# Patient Record
Sex: MALE | URBAN - METROPOLITAN AREA
[De-identification: names, ages, dates, MRNs, and addresses within clinical notes are randomized per-mention and may not be internally consistent; named-entity substitution may affect disease eponyms.]

---

## 2022-10-08 ENCOUNTER — INPATIENT (INPATIENT)
Facility: HOSPITAL | Age: 72
LOS: 4 days | Discharge: HOME | End: 2022-10-13
Attending: INTERNAL MEDICINE | Admitting: INTERNAL MEDICINE

## 2022-10-08 VITALS
DIASTOLIC BLOOD PRESSURE: 99 MMHG | HEIGHT: 62 IN | RESPIRATION RATE: 26 BRPM | OXYGEN SATURATION: 100 % | WEIGHT: 134.04 LBS | TEMPERATURE: 99 F | HEART RATE: 146 BPM | SYSTOLIC BLOOD PRESSURE: 162 MMHG

## 2022-10-08 DIAGNOSIS — A41.9 SEPSIS, UNSPECIFIED ORGANISM: ICD-10-CM

## 2022-10-08 DIAGNOSIS — J18.9 PNEUMONIA, UNSPECIFIED ORGANISM: ICD-10-CM

## 2022-10-08 DIAGNOSIS — J44.1 CHRONIC OBSTRUCTIVE PULMONARY DISEASE WITH (ACUTE) EXACERBATION: ICD-10-CM

## 2022-10-08 DIAGNOSIS — I10 ESSENTIAL (PRIMARY) HYPERTENSION: ICD-10-CM

## 2022-10-08 LAB
ALBUMIN SERPL ELPH-MCNC: 3.3 G/DL — SIGNIFICANT CHANGE UP (ref 3.3–5)
ALP SERPL-CCNC: 88 U/L — SIGNIFICANT CHANGE UP (ref 40–120)
ALT FLD-CCNC: 18 U/L — SIGNIFICANT CHANGE UP (ref 12–78)
ANION GAP SERPL CALC-SCNC: 7 MMOL/L — SIGNIFICANT CHANGE UP (ref 5–17)
ANISOCYTOSIS BLD QL: SLIGHT — SIGNIFICANT CHANGE UP
APTT BLD: 27.9 SEC — SIGNIFICANT CHANGE UP (ref 27.5–35.5)
AST SERPL-CCNC: 11 U/L — LOW (ref 15–37)
BASE EXCESS BLDA CALC-SCNC: 6.9 MMOL/L — HIGH (ref -2–3)
BASOPHILS # BLD AUTO: 0 K/UL — SIGNIFICANT CHANGE UP (ref 0–0.2)
BASOPHILS NFR BLD AUTO: 0 % — SIGNIFICANT CHANGE UP (ref 0–2)
BILIRUB SERPL-MCNC: 0.5 MG/DL — SIGNIFICANT CHANGE UP (ref 0.2–1.2)
BLOOD GAS COMMENTS ARTERIAL: SIGNIFICANT CHANGE UP
BUN SERPL-MCNC: 25 MG/DL — HIGH (ref 7–23)
CALCIUM SERPL-MCNC: 9.3 MG/DL — SIGNIFICANT CHANGE UP (ref 8.5–10.1)
CHLORIDE SERPL-SCNC: 98 MMOL/L — SIGNIFICANT CHANGE UP (ref 96–108)
CO2 BLDA-SCNC: 36 MMOL/L — HIGH (ref 19–24)
CO2 SERPL-SCNC: 31 MMOL/L — SIGNIFICANT CHANGE UP (ref 22–31)
CREAT SERPL-MCNC: 1.02 MG/DL — SIGNIFICANT CHANGE UP (ref 0.5–1.3)
D DIMER BLD IA.RAPID-MCNC: 261 NG/ML DDU — HIGH
EGFR: 80 ML/MIN/1.73M2 — SIGNIFICANT CHANGE UP
EOSINOPHIL # BLD AUTO: 0 K/UL — SIGNIFICANT CHANGE UP (ref 0–0.5)
EOSINOPHIL NFR BLD AUTO: 0 % — SIGNIFICANT CHANGE UP (ref 0–6)
FLUAV AG NPH QL: SIGNIFICANT CHANGE UP
FLUBV AG NPH QL: SIGNIFICANT CHANGE UP
GAS PNL BLDA: SIGNIFICANT CHANGE UP
GLUCOSE SERPL-MCNC: 185 MG/DL — HIGH (ref 70–99)
HCO3 BLDA-SCNC: 34 MMOL/L — HIGH (ref 21–28)
HCT VFR BLD CALC: 42.6 % — SIGNIFICANT CHANGE UP (ref 39–50)
HGB BLD-MCNC: 13.2 G/DL — SIGNIFICANT CHANGE UP (ref 13–17)
HOROWITZ INDEX BLDA+IHG-RTO: 50 — SIGNIFICANT CHANGE UP
INR BLD: 1.08 RATIO — SIGNIFICANT CHANGE UP (ref 0.88–1.16)
LACTATE SERPL-SCNC: 1.8 MMOL/L — SIGNIFICANT CHANGE UP (ref 0.7–2)
LG PLATELETS BLD QL AUTO: SIGNIFICANT CHANGE UP
LYMPHOCYTES # BLD AUTO: 0.43 K/UL — LOW (ref 1–3.3)
LYMPHOCYTES # BLD AUTO: 2 % — LOW (ref 13–44)
MACROCYTES BLD QL: SLIGHT — SIGNIFICANT CHANGE UP
MANUAL SMEAR VERIFICATION: SIGNIFICANT CHANGE UP
MCHC RBC-ENTMCNC: 26.2 PG — LOW (ref 27–34)
MCHC RBC-ENTMCNC: 31 G/DL — LOW (ref 32–36)
MCV RBC AUTO: 84.7 FL — SIGNIFICANT CHANGE UP (ref 80–100)
MONOCYTES # BLD AUTO: 0.65 K/UL — SIGNIFICANT CHANGE UP (ref 0–0.9)
MONOCYTES NFR BLD AUTO: 3 % — SIGNIFICANT CHANGE UP (ref 2–14)
NEUTROPHILS # BLD AUTO: 20.54 K/UL — HIGH (ref 1.8–7.4)
NEUTROPHILS NFR BLD AUTO: 92 % — HIGH (ref 43–77)
NEUTS BAND # BLD: 3 % — SIGNIFICANT CHANGE UP (ref 0–8)
NRBC # BLD: 0 /100 — SIGNIFICANT CHANGE UP (ref 0–0)
NRBC # BLD: SIGNIFICANT CHANGE UP /100 WBCS (ref 0–0)
NT-PROBNP SERPL-SCNC: 417 PG/ML — HIGH (ref 0–125)
OVALOCYTES BLD QL SMEAR: SLIGHT — SIGNIFICANT CHANGE UP
PCO2 BLDA: 59 MMHG — HIGH (ref 32–46)
PH BLDA: 7.37 — SIGNIFICANT CHANGE UP (ref 7.35–7.45)
PLAT MORPH BLD: NORMAL — SIGNIFICANT CHANGE UP
PLATELET # BLD AUTO: 501 K/UL — HIGH (ref 150–400)
PLATELET COUNT - ESTIMATE: NORMAL — SIGNIFICANT CHANGE UP
PO2 BLDA: 227 MMHG — HIGH (ref 83–108)
POIKILOCYTOSIS BLD QL AUTO: SLIGHT — SIGNIFICANT CHANGE UP
POLYCHROMASIA BLD QL SMEAR: SLIGHT — SIGNIFICANT CHANGE UP
POTASSIUM SERPL-MCNC: 5.2 MMOL/L — SIGNIFICANT CHANGE UP (ref 3.5–5.3)
POTASSIUM SERPL-SCNC: 5.2 MMOL/L — SIGNIFICANT CHANGE UP (ref 3.5–5.3)
PROT SERPL-MCNC: 8 GM/DL — SIGNIFICANT CHANGE UP (ref 6–8.3)
PROTHROM AB SERPL-ACNC: 12.9 SEC — SIGNIFICANT CHANGE UP (ref 10.5–13.4)
RBC # BLD: 5.03 M/UL — SIGNIFICANT CHANGE UP (ref 4.2–5.8)
RBC # FLD: 14.8 % — HIGH (ref 10.3–14.5)
RBC BLD AUTO: SIGNIFICANT CHANGE UP
SAO2 % BLDA: 100 % — HIGH (ref 94–98)
SARS-COV-2 RNA SPEC QL NAA+PROBE: SIGNIFICANT CHANGE UP
SODIUM SERPL-SCNC: 136 MMOL/L — SIGNIFICANT CHANGE UP (ref 135–145)
STOMATOCYTES BLD QL SMEAR: SLIGHT — SIGNIFICANT CHANGE UP
TARGETS BLD QL SMEAR: SLIGHT — SIGNIFICANT CHANGE UP
WBC # BLD: 21.62 K/UL — HIGH (ref 3.8–10.5)
WBC # FLD AUTO: 21.62 K/UL — HIGH (ref 3.8–10.5)

## 2022-10-08 PROCEDURE — 99223 1ST HOSP IP/OBS HIGH 75: CPT

## 2022-10-08 PROCEDURE — 99285 EMERGENCY DEPT VISIT HI MDM: CPT

## 2022-10-08 PROCEDURE — 71045 X-RAY EXAM CHEST 1 VIEW: CPT | Mod: 26

## 2022-10-08 PROCEDURE — 93010 ELECTROCARDIOGRAM REPORT: CPT

## 2022-10-08 RX ORDER — BUDESONIDE AND FORMOTEROL FUMARATE DIHYDRATE 160; 4.5 UG/1; UG/1
2 AEROSOL RESPIRATORY (INHALATION)
Refills: 0 | Status: DISCONTINUED | OUTPATIENT
Start: 2022-10-08 | End: 2022-10-13

## 2022-10-08 RX ORDER — AZITHROMYCIN 500 MG/1
500 TABLET, FILM COATED ORAL EVERY 24 HOURS
Refills: 0 | Status: DISCONTINUED | OUTPATIENT
Start: 2022-10-08 | End: 2022-10-10

## 2022-10-08 RX ORDER — LANOLIN ALCOHOL/MO/W.PET/CERES
3 CREAM (GRAM) TOPICAL AT BEDTIME
Refills: 0 | Status: DISCONTINUED | OUTPATIENT
Start: 2022-10-08 | End: 2022-10-13

## 2022-10-08 RX ORDER — ACETAMINOPHEN 500 MG
650 TABLET ORAL EVERY 6 HOURS
Refills: 0 | Status: DISCONTINUED | OUTPATIENT
Start: 2022-10-08 | End: 2022-10-13

## 2022-10-08 RX ORDER — CEFTRIAXONE 500 MG/1
1000 INJECTION, POWDER, FOR SOLUTION INTRAMUSCULAR; INTRAVENOUS ONCE
Refills: 0 | Status: COMPLETED | OUTPATIENT
Start: 2022-10-08 | End: 2022-10-08

## 2022-10-08 RX ORDER — AZITHROMYCIN 500 MG/1
500 TABLET, FILM COATED ORAL ONCE
Refills: 0 | Status: COMPLETED | OUTPATIENT
Start: 2022-10-08 | End: 2022-10-08

## 2022-10-08 RX ORDER — IPRATROPIUM/ALBUTEROL SULFATE 18-103MCG
3 AEROSOL WITH ADAPTER (GRAM) INHALATION ONCE
Refills: 0 | Status: COMPLETED | OUTPATIENT
Start: 2022-10-08 | End: 2022-10-08

## 2022-10-08 RX ORDER — NIFEDIPINE 30 MG
30 TABLET, EXTENDED RELEASE 24 HR ORAL DAILY
Refills: 0 | Status: DISCONTINUED | OUTPATIENT
Start: 2022-10-08 | End: 2022-10-13

## 2022-10-08 RX ORDER — ONDANSETRON 8 MG/1
4 TABLET, FILM COATED ORAL EVERY 8 HOURS
Refills: 0 | Status: DISCONTINUED | OUTPATIENT
Start: 2022-10-08 | End: 2022-10-13

## 2022-10-08 RX ORDER — IPRATROPIUM/ALBUTEROL SULFATE 18-103MCG
2 AEROSOL WITH ADAPTER (GRAM) INHALATION
Refills: 0 | Status: DISCONTINUED | OUTPATIENT
Start: 2022-10-08 | End: 2022-10-09

## 2022-10-08 RX ORDER — ALBUTEROL 90 UG/1
2.5 AEROSOL, METERED ORAL
Refills: 0 | Status: COMPLETED | OUTPATIENT
Start: 2022-10-08 | End: 2022-10-08

## 2022-10-08 RX ORDER — CEFTRIAXONE 500 MG/1
1000 INJECTION, POWDER, FOR SOLUTION INTRAMUSCULAR; INTRAVENOUS EVERY 24 HOURS
Refills: 0 | Status: DISCONTINUED | OUTPATIENT
Start: 2022-10-08 | End: 2022-10-10

## 2022-10-08 RX ADMIN — AZITHROMYCIN 255 MILLIGRAM(S): 500 TABLET, FILM COATED ORAL at 23:26

## 2022-10-08 RX ADMIN — Medication 3 MILLILITER(S): at 19:34

## 2022-10-08 RX ADMIN — ALBUTEROL 2.5 MILLIGRAM(S): 90 AEROSOL, METERED ORAL at 23:04

## 2022-10-08 RX ADMIN — CEFTRIAXONE 1000 MILLIGRAM(S): 500 INJECTION, POWDER, FOR SOLUTION INTRAMUSCULAR; INTRAVENOUS at 23:22

## 2022-10-08 RX ADMIN — ALBUTEROL 2.5 MILLIGRAM(S): 90 AEROSOL, METERED ORAL at 22:35

## 2022-10-08 RX ADMIN — CEFTRIAXONE 100 MILLIGRAM(S): 500 INJECTION, POWDER, FOR SOLUTION INTRAMUSCULAR; INTRAVENOUS at 22:51

## 2022-10-08 RX ADMIN — ALBUTEROL 2.5 MILLIGRAM(S): 90 AEROSOL, METERED ORAL at 23:39

## 2022-10-08 RX ADMIN — Medication 125 MILLIGRAM(S): at 19:35

## 2022-10-08 NOTE — H&P ADULT - NSHPPHYSICALEXAM_GEN_ALL_CORE
Constitutional: NAD AAO x 3   HEENT PERRLA EOMI  CV RRR S1S2  Pulm diffusely restricted breath sounds   GI soft nontender nondistended + BS   Neuro CN II-XII grossly intact   Extremities no edema or calf tenderness

## 2022-10-08 NOTE — ED ADULT TRIAGE NOTE - CHIEF COMPLAINT QUOTE
brought by ems for shortness of breath . As per ems patient was smoking all day . pt lives at a shelter . history of asthma. initial oxygen saturation was 86 and 100%NRM placed on the patient. one combi nebulizer given to the patient .

## 2022-10-08 NOTE — ED PROVIDER NOTE - OBJECTIVE STATEMENT
72 y/o M with PMHx of asthma, COPD, presents to the ED for SOB worsening for x4 days. Pt went to PMD was prescribed penicillin for presumed PNE. Pt denies CP, hx of CHF, leg pain or swelling, sick contact, recent travel. Pt is not O2 dependent at home. Pt denies any prior intubation. Pt admits to be ongoing smoker. 70 y/o M with PMHx of asthma, COPD, emphysema, presents to the ED for SOB worsening for x4 days. Pt went to PMD and was prescribed penicillin for presumed PNE. Pt denies CP, hx of CHF, leg pain or swelling, denies sick contact, recent travel. Pt is not O2 dependent at home. Pt denies any prior intubation. Pt admits to be ongoing smoker.  He lives in a shelter, family at bedside states poor follow up.  No fevers.

## 2022-10-08 NOTE — ED ADULT NURSE NOTE - OBJECTIVE STATEMENT
pt BIBA for sob spoke shefali the family as per the family he has bad lung, h/o COPD and asthma. pt is restless, respiration 50's. placed on BIPAP c/o RT, nebulization given. on cardiac monitor.

## 2022-10-08 NOTE — H&P ADULT - HISTORY OF PRESENT ILLNESS
This is a 72 y/o M active smoker with PMHx of asthma, COPD, emphysema, presents due to worsening sob x4 days. At baseline patient does not require O2 and has never been intubated. was recently prescribed penicillin for presumed PNA by pcp. denies sick contacts, f/c, h/a, cough, cp, palpitations, abd pain, n/v/d/c, syncope. no recent steroid use. presenting vitals 162/99 hypoxic on RA placed on 12 L NRB. tachy 146 bpm and tachypneic 26. wbc 21.6, abg compensated respiratory acidosis co2 59. Given azithro, rocephin, nebs and medrol in ED

## 2022-10-08 NOTE — H&P ADULT - ASSESSMENT
This is a 70 y/o M active smoker with PMHx of asthma, COPD, emphysema, presents due to worsening sob x4 days. At baseline patient does not require O2 and has never been intubated. was recently prescribed penicillin for presumed PNA by pcp. denies sick contacts, f/c, h/a, cough, cp, palpitations, abd pain, n/v/d/c, syncope. no recent steroid use. presenting vitals 162/99 hypoxic on RA placed on 12 L NRB. tachy 146 bpm and tachypneic 26. wbc 21.6, abg compensated respiratory acidosis co2 59. Given azithro, rocephin, nebs and medrol in ED    acute hypoxic hypercapneic respiratory failure   acute copd exacerbation   cap   sepsis  htn   -azithro, rocephin  -nebs, symbicort, medrol  -f/u cultures   -supplemental O2 for sat goal> 92%   -start nifedipine 30 d   -regular diet, scds

## 2022-10-08 NOTE — ED PROVIDER NOTE - CLINICAL SUMMARY MEDICAL DECISION MAKING FREE TEXT BOX
Patient with COPD exacerbation.  Stable on BIpap, downgraded to cannula.  VSS.  Labs, ABG, CXR findings reviewed.  Age adjusted dimer negative.  Patient is to be admitted to the hospital and the case was discussed with the admitting physician.  Any changes in plan, additional imaging/labs, and further work up will be at the discretion of the admitting physician. Per discussion with admitting physician, all necessary consults for admitted patients to be obtained by morning team unless patient requires emergent intervention or medical advice by specialist overnight.

## 2022-10-09 DIAGNOSIS — Z96.643 PRESENCE OF ARTIFICIAL HIP JOINT, BILATERAL: Chronic | ICD-10-CM

## 2022-10-09 LAB
ANION GAP SERPL CALC-SCNC: 8 MMOL/L — SIGNIFICANT CHANGE UP (ref 5–17)
APPEARANCE UR: CLEAR — SIGNIFICANT CHANGE UP
BACTERIA # UR AUTO: ABNORMAL
BASOPHILS # BLD AUTO: 0.01 K/UL — SIGNIFICANT CHANGE UP (ref 0–0.2)
BASOPHILS NFR BLD AUTO: 0.1 % — SIGNIFICANT CHANGE UP (ref 0–2)
BILIRUB UR-MCNC: NEGATIVE — SIGNIFICANT CHANGE UP
BUN SERPL-MCNC: 30 MG/DL — HIGH (ref 7–23)
CALCIUM SERPL-MCNC: 9.3 MG/DL — SIGNIFICANT CHANGE UP (ref 8.5–10.1)
CHLORIDE SERPL-SCNC: 97 MMOL/L — SIGNIFICANT CHANGE UP (ref 96–108)
CO2 SERPL-SCNC: 30 MMOL/L — SIGNIFICANT CHANGE UP (ref 22–31)
COLOR SPEC: YELLOW — SIGNIFICANT CHANGE UP
COMMENT - URINE: SIGNIFICANT CHANGE UP
CREAT SERPL-MCNC: 0.8 MG/DL — SIGNIFICANT CHANGE UP (ref 0.5–1.3)
DIFF PNL FLD: ABNORMAL
EGFR: 96 ML/MIN/1.73M2 — SIGNIFICANT CHANGE UP
EOSINOPHIL # BLD AUTO: 0 K/UL — SIGNIFICANT CHANGE UP (ref 0–0.5)
EOSINOPHIL NFR BLD AUTO: 0 % — SIGNIFICANT CHANGE UP (ref 0–6)
GLUCOSE SERPL-MCNC: 191 MG/DL — HIGH (ref 70–99)
GLUCOSE UR QL: 250 MG/DL
HCT VFR BLD CALC: 37.3 % — LOW (ref 39–50)
HCV AB S/CO SERPL IA: 0.14 S/CO — SIGNIFICANT CHANGE UP (ref 0–0.99)
HCV AB SERPL-IMP: SIGNIFICANT CHANGE UP
HGB BLD-MCNC: 11.4 G/DL — LOW (ref 13–17)
IMM GRANULOCYTES NFR BLD AUTO: 0.6 % — SIGNIFICANT CHANGE UP (ref 0–0.9)
KETONES UR-MCNC: NEGATIVE — SIGNIFICANT CHANGE UP
LEUKOCYTE ESTERASE UR-ACNC: NEGATIVE — SIGNIFICANT CHANGE UP
LYMPHOCYTES # BLD AUTO: 0.41 K/UL — LOW (ref 1–3.3)
LYMPHOCYTES # BLD AUTO: 2.9 % — LOW (ref 13–44)
MCHC RBC-ENTMCNC: 25.9 PG — LOW (ref 27–34)
MCHC RBC-ENTMCNC: 30.6 G/DL — LOW (ref 32–36)
MCV RBC AUTO: 84.8 FL — SIGNIFICANT CHANGE UP (ref 80–100)
MONOCYTES # BLD AUTO: 0.4 K/UL — SIGNIFICANT CHANGE UP (ref 0–0.9)
MONOCYTES NFR BLD AUTO: 2.8 % — SIGNIFICANT CHANGE UP (ref 2–14)
NEUTROPHILS # BLD AUTO: 13.39 K/UL — HIGH (ref 1.8–7.4)
NEUTROPHILS NFR BLD AUTO: 93.6 % — HIGH (ref 43–77)
NITRITE UR-MCNC: NEGATIVE — SIGNIFICANT CHANGE UP
NRBC # BLD: 0 /100 WBCS — SIGNIFICANT CHANGE UP (ref 0–0)
PH UR: 6.5 — SIGNIFICANT CHANGE UP (ref 5–8)
PLATELET # BLD AUTO: 416 K/UL — HIGH (ref 150–400)
POTASSIUM SERPL-MCNC: 4.7 MMOL/L — SIGNIFICANT CHANGE UP (ref 3.5–5.3)
POTASSIUM SERPL-SCNC: 4.7 MMOL/L — SIGNIFICANT CHANGE UP (ref 3.5–5.3)
PROT UR-MCNC: 15 MG/DL
RBC # BLD: 4.4 M/UL — SIGNIFICANT CHANGE UP (ref 4.2–5.8)
RBC # FLD: 14.8 % — HIGH (ref 10.3–14.5)
RBC CASTS # UR COMP ASSIST: SIGNIFICANT CHANGE UP /HPF (ref 0–4)
SODIUM SERPL-SCNC: 135 MMOL/L — SIGNIFICANT CHANGE UP (ref 135–145)
SP GR SPEC: 1.01 — SIGNIFICANT CHANGE UP (ref 1.01–1.02)
UROBILINOGEN FLD QL: NEGATIVE MG/DL — SIGNIFICANT CHANGE UP
WBC # BLD: 14.3 K/UL — HIGH (ref 3.8–10.5)
WBC # FLD AUTO: 14.3 K/UL — HIGH (ref 3.8–10.5)
WBC UR QL: SIGNIFICANT CHANGE UP

## 2022-10-09 PROCEDURE — 99233 SBSQ HOSP IP/OBS HIGH 50: CPT

## 2022-10-09 RX ORDER — NIFEDIPINE 30 MG
1 TABLET, EXTENDED RELEASE 24 HR ORAL
Qty: 0 | Refills: 0 | DISCHARGE
Start: 2022-10-09

## 2022-10-09 RX ORDER — TIOTROPIUM BROMIDE 18 UG/1
1 CAPSULE ORAL; RESPIRATORY (INHALATION) DAILY
Refills: 0 | Status: DISCONTINUED | OUTPATIENT
Start: 2022-10-09 | End: 2022-10-13

## 2022-10-09 RX ORDER — TIOTROPIUM BROMIDE 18 UG/1
1 CAPSULE ORAL; RESPIRATORY (INHALATION)
Qty: 30 | Refills: 0
Start: 2022-10-09 | End: 2022-11-07

## 2022-10-09 RX ORDER — INFLUENZA VIRUS VACCINE 15; 15; 15; 15 UG/.5ML; UG/.5ML; UG/.5ML; UG/.5ML
0.7 SUSPENSION INTRAMUSCULAR ONCE
Refills: 0 | Status: COMPLETED | OUTPATIENT
Start: 2022-10-09 | End: 2022-10-09

## 2022-10-09 RX ORDER — POLYETHYLENE GLYCOL 3350 17 G/17G
17 POWDER, FOR SOLUTION ORAL DAILY
Refills: 0 | Status: DISCONTINUED | OUTPATIENT
Start: 2022-10-09 | End: 2022-10-13

## 2022-10-09 RX ORDER — BUDESONIDE AND FORMOTEROL FUMARATE DIHYDRATE 160; 4.5 UG/1; UG/1
2 AEROSOL RESPIRATORY (INHALATION)
Qty: 120 | Refills: 0
Start: 2022-10-09 | End: 2022-11-07

## 2022-10-09 RX ORDER — SENNA PLUS 8.6 MG/1
2 TABLET ORAL AT BEDTIME
Refills: 0 | Status: DISCONTINUED | OUTPATIENT
Start: 2022-10-09 | End: 2022-10-13

## 2022-10-09 RX ADMIN — Medication 650 MILLIGRAM(S): at 04:00

## 2022-10-09 RX ADMIN — TIOTROPIUM BROMIDE 1 CAPSULE(S): 18 CAPSULE ORAL; RESPIRATORY (INHALATION) at 12:16

## 2022-10-09 RX ADMIN — POLYETHYLENE GLYCOL 3350 17 GRAM(S): 17 POWDER, FOR SOLUTION ORAL at 17:44

## 2022-10-09 RX ADMIN — Medication 40 MILLIGRAM(S): at 14:29

## 2022-10-09 RX ADMIN — Medication 40 MILLIGRAM(S): at 05:25

## 2022-10-09 RX ADMIN — CEFTRIAXONE 100 MILLIGRAM(S): 500 INJECTION, POWDER, FOR SOLUTION INTRAMUSCULAR; INTRAVENOUS at 05:25

## 2022-10-09 RX ADMIN — BUDESONIDE AND FORMOTEROL FUMARATE DIHYDRATE 2 PUFF(S): 160; 4.5 AEROSOL RESPIRATORY (INHALATION) at 17:44

## 2022-10-09 RX ADMIN — ONDANSETRON 4 MILLIGRAM(S): 8 TABLET, FILM COATED ORAL at 00:38

## 2022-10-09 RX ADMIN — Medication 650 MILLIGRAM(S): at 19:34

## 2022-10-09 RX ADMIN — AZITHROMYCIN 255 MILLIGRAM(S): 500 TABLET, FILM COATED ORAL at 06:15

## 2022-10-09 RX ADMIN — Medication 650 MILLIGRAM(S): at 03:12

## 2022-10-09 RX ADMIN — Medication 3 MILLIGRAM(S): at 21:04

## 2022-10-09 RX ADMIN — SENNA PLUS 2 TABLET(S): 8.6 TABLET ORAL at 21:04

## 2022-10-09 RX ADMIN — INFLUENZA VIRUS VACCINE 0.7 MILLILITER(S): 15; 15; 15; 15 SUSPENSION INTRAMUSCULAR at 14:30

## 2022-10-09 RX ADMIN — Medication 30 MILLILITER(S): at 10:47

## 2022-10-09 RX ADMIN — Medication 650 MILLIGRAM(S): at 02:00

## 2022-10-09 RX ADMIN — Medication 30 MILLIGRAM(S): at 05:26

## 2022-10-09 RX ADMIN — BUDESONIDE AND FORMOTEROL FUMARATE DIHYDRATE 2 PUFF(S): 160; 4.5 AEROSOL RESPIRATORY (INHALATION) at 05:27

## 2022-10-09 RX ADMIN — Medication 5 MILLIGRAM(S): at 17:43

## 2022-10-09 RX ADMIN — Medication 40 MILLIGRAM(S): at 21:04

## 2022-10-09 NOTE — DISCHARGE NOTE PROVIDER - HOSPITAL COURSE
acute hypoxic hypercapneic respiratory failure   acute copd exacerbation   cap   sepsis  htn   -azithro, rocephin  -nebs, symbicort, medrol  -f/u cultures   -supplemental O2 for sat goal> 92%   -start nifedipine 30 d   -regular diet, scds   This is a 72 y/o M active smoker with PMHx of asthma, COPD, emphysema, presents due to worsening sob x4 days. At baseline patient does not require O2 and has never been intubated. was recently prescribed penicillin for presumed PNA by pcp. denies sick contacts, f/c, h/a, cough, cp, palpitations, abd pain, n/v/d/c, syncope. no recent steroid use. presenting vitals 162/99 hypoxic on RA placed on 12 L NRB. tachy 146 bpm and tachypneic 26. wbc 21.6, abg compensated respiratory acidosis co2 59. Given azithro, rocephin, nebs and medrol in ED. PAtient admitted with acute on chronic respiratory failure. Pulmonology, ID consulted. Patient was initially started on zosyn, abx dc'ed. Plan for bactrim to be started for PCP prophylaxis while on chronic steroids. Per Dr. Villarreal patient stable for discharge.     - active smoker, nicotine patch    - due to extended course of steroid therapy would place on PCP prophylaxis (bactrim DS three times a week)  - cont duonebs ATC  - can use aerobika assist cough device with nebulizers to help mobilize secretions  - upon d/c he should resume his trelegy  - pt also reveals that his gastroenterologist stated to him he needs "baby food"- may benefit from putting pt on purree diet with esophageal motility issues  - will follow  - outpatient records were placed in chart this afternoon  - d/w hospitalist, ID, patient

## 2022-10-09 NOTE — DISCHARGE NOTE PROVIDER - NSDCFUADDAPPT_GEN_ALL_CORE_FT
Please followup with your PCP APPTS ARE READY TO BE MADE: [x ] YES    Best Family or Patient Contact (if needed):    Additional Information about above appointments (if needed):    1:   2:   3:     Other comments or requests:    APPTS ARE READY TO BE MADE: [x ] YES    Best Family or Patient Contact (if needed):    Additional Information about above appointments (if needed):    1:   2:   3:     Other comments or requests:   3 attempts were made to reach patient, which have been unsuccessful. Unable to leave voicemail on10/14, 10/15, 10/16. Will await call back from patient to coordinate follow up care.   APPTS ARE READY TO BE MADE: [x ] YES    Best Family or Patient Contact (if needed):    Additional Information about above appointments (if needed):    1:   2:   3:     Other comments or requests:   3 attempts were made to reach patient, which have been unsuccessful. Unable to leave voicemail on10/14, 10/15, 10/16. Will await call back from patient to coordinate follow up care.    Patient stated he has appt hua for today, pt appears to be having difficulty speaking and did not share the provider's name.

## 2022-10-09 NOTE — DISCHARGE NOTE PROVIDER - NSDCMRMEDTOKEN_GEN_ALL_CORE_FT
budesonide-formoterol 160 mcg-4.5 mcg/inh inhalation aerosol: 2 puff(s) inhaled 2 times a day   NIFEdipine 30 mg oral tablet, extended release: 1 tab(s) orally once a day  predniSONE 10 mg oral tablet: 4 tab(s) orally once a day x 3 days  3 tab(s) orally once a day x 3 days  2 tab(s) orally once a day x 3 days  1 tab(s) orally once a day x 3 days  tiotropium 18 mcg inhalation capsule: 1 cap(s) inhaled once a day   budesonide-formoterol 160 mcg-4.5 mcg/inh inhalation aerosol: 2 puff(s) inhaled 2 times a day   nicotine 21 mg/24 hr transdermal film, extended release: 1 application transdermal once a day   NIFEdipine 30 mg oral tablet, extended release: 1 tab(s) orally once a day  predniSONE 10 mg oral tablet: 4 tab(s) orally once a day x 3 days  3 tab(s) orally once a day x 3 days  2 tab(s) orally once a day x 3 days  1 tab(s) orally once a day x 3 days  tiotropium 18 mcg inhalation capsule: 1 cap(s) inhaled once a day   albuterol 90 mcg/inh inhalation powder: 2 puff(s) inhaled every 6 hours, As Needed -for shortness of breath and/or wheezing   budesonide-formoterol 160 mcg-4.5 mcg/inh inhalation aerosol: 2 puff(s) inhaled 2 times a day   nicotine 21 mg/24 hr transdermal film, extended release: 1 application transdermal once a day   NIFEdipine 30 mg oral tablet, extended release: 1 tab(s) orally once a day  predniSONE 20 mg oral tablet: 2 tab(s) orally once a day   sulfamethoxazole-trimethoprim 800 mg-160 mg oral tablet: 1 tab(s) orally once a day  tiotropium 18 mcg inhalation capsule: 1 cap(s) inhaled once a day

## 2022-10-09 NOTE — DISCHARGE NOTE PROVIDER - DETAILS OF MALNUTRITION DIAGNOSIS/DIAGNOSES
This patient has been assessed with a concern for Malnutrition and was treated during this hospitalization for the following Nutrition diagnosis/diagnoses:     -  10/12/2022: Moderate protein-calorie malnutrition

## 2022-10-09 NOTE — DISCHARGE NOTE PROVIDER - NSDCCPCAREPLAN_GEN_ALL_CORE_FT
PRINCIPAL DISCHARGE DIAGNOSIS  Diagnosis: COPD exacerbation  Assessment and Plan of Treatment: Please start the steroid taper as prescribed  we have prescribed you inhaled steroids as well       PRINCIPAL DISCHARGE DIAGNOSIS  Diagnosis: COPD exacerbation  Assessment and Plan of Treatment: Please start the steroid taper as prescribed  we have prescribed you inhaled steroids as well      SECONDARY DISCHARGE DIAGNOSES  Diagnosis: Pneumonia  Assessment and Plan of Treatment: continue bactrim     PRINCIPAL DISCHARGE DIAGNOSIS  Diagnosis: COPD exacerbation  Assessment and Plan of Treatment: Please continue taking prednisone 40mg once a day. A prescription was sent for a 15 day supply and you will need to follow up with the pulmonologist for a slow prednisone taper from weeks to months. Continue taking bactrim one tablet a day for prophylaxis while on chronic steroids. resume treoligy when discharge. use inhalers.      SECONDARY DISCHARGE DIAGNOSES  Diagnosis: Nicotine addiction  Assessment and Plan of Treatment: nicotine patch prescribed

## 2022-10-09 NOTE — DISCHARGE NOTE PROVIDER - ATTENDING DISCHARGE PHYSICAL EXAMINATION:
Objective:    Vitals:  T(C): 36.4 (10-09-22 @ 10:54), Max: 37.1 (10-08-22 @ 19:04)  HR: 105 (10-09-22 @ 10:54) (81 - 146)  BP: 135/78 (10-09-22 @ 10:54) (124/76 - 162/99)  RR: 20 (10-09-22 @ 11:09) (20 - 45)  SpO2: 90% (10-09-22 @ 11:09) (90% - 100%)    Physical Exam:  General: comfortable, no acute distress, well nourished  HEENT: Atraumatic, no LAD, trachea midline, PERRLA  Cardiovascular: normal s1s2, no murmurs, gallops or fricition rubs  Pulmonary: mild expiratory wheezing, no rhonchi  Gastrointestinal: soft non tender non distended, no masses felt, no organomegally  Muscloskeletal: no lower extremity edema, intact bilateral lower extremity pulses  Neurological: CN II-12 intact. No focal weakness  Psychiatrical: normal mood, cooperative  SKIN: no rash, lesions or ulcers

## 2022-10-09 NOTE — DISCHARGE NOTE PROVIDER - NSDCFUADDINST_GEN_ALL_CORE_FT
It is important to see your primary physician as well as any specialty physicians within the next week to perform a comprehensive medical review.  Call their offices for an appointment as soon as you leave the hospital.  You will also need to see them for renewal of your medications.  If have any difficulty following with a physician, contact the [facility] [phone] or via [email or web address].   To obtain your results, you can access the [email or web address] Patient Portal at [email or web address].  Your medical issues appear to be stable at this time, but if your symptoms recur or worsen, contact your physicians and/or return to the hospital if necessary.  If you encounter any issues or questions with your medication, call your physicians before stopping the medication.  Do not drive.  Limit your diet to 2 grams of sodium daily.

## 2022-10-09 NOTE — PATIENT PROFILE ADULT - FALL HARM RISK - HARM RISK INTERVENTIONS
Assistance with ambulation/Assistance OOB with selected safe patient handling equipment/Communicate Risk of Fall with Harm to all staff/Reinforce activity limits and safety measures with patient and family/Tailored Fall Risk Interventions/Use of alarms - bed, chair and/or voice tab/Visual Cue: Yellow wristband and red socks/Bed in lowest position, wheels locked, appropriate side rails in place/Call bell, personal items and telephone in reach/Instruct patient to call for assistance before getting out of bed or chair/Non-slip footwear when patient is out of bed/[place] to call system/Physically safe environment - no spills, clutter or unnecessary equipment/Purposeful Proactive Rounding/Room/bathroom lighting operational, light cord in reach

## 2022-10-09 NOTE — DISCHARGE NOTE PROVIDER - CARE PROVIDER_API CALL
PCP,   Phone: (   )    -  Fax: (   )    -  Follow Up Time: 1 week   PCP,   Phone: (   )    -  Fax: (   )    -  Follow Up Time: 1 week    Juni Moss)  Medicine  [address], [address]  [place] [address]  Phone: [phone]  Fax: [phone]  Follow Up Time: 1 week

## 2022-10-09 NOTE — DISCHARGE NOTE PROVIDER - PROVIDER TOKENS
FREE:[LAST:[PCP],PHONE:[(   )    -],FAX:[(   )    -],FOLLOWUP:[1 week]] FREE:[LAST:[PCP],PHONE:[(   )    -],FAX:[(   )    -],FOLLOWUP:[1 week]],PROVIDER:[TOKEN:[[id number]],FOLLOWUP:[1 week]]

## 2022-10-10 LAB
CULTURE RESULTS: SIGNIFICANT CHANGE UP
SPECIMEN SOURCE: SIGNIFICANT CHANGE UP

## 2022-10-10 PROCEDURE — 99239 HOSP IP/OBS DSCHRG MGMT >30: CPT

## 2022-10-10 RX ORDER — AZITHROMYCIN 500 MG/1
500 TABLET, FILM COATED ORAL DAILY
Refills: 0 | Status: DISCONTINUED | OUTPATIENT
Start: 2022-10-11 | End: 2022-10-11

## 2022-10-10 RX ORDER — NICOTINE POLACRILEX 2 MG
1 GUM BUCCAL DAILY
Refills: 0 | Status: DISCONTINUED | OUTPATIENT
Start: 2022-10-10 | End: 2022-10-13

## 2022-10-10 RX ADMIN — Medication 40 MILLIGRAM(S): at 13:04

## 2022-10-10 RX ADMIN — ONDANSETRON 4 MILLIGRAM(S): 8 TABLET, FILM COATED ORAL at 18:22

## 2022-10-10 RX ADMIN — POLYETHYLENE GLYCOL 3350 17 GRAM(S): 17 POWDER, FOR SOLUTION ORAL at 11:14

## 2022-10-10 RX ADMIN — TIOTROPIUM BROMIDE 1 CAPSULE(S): 18 CAPSULE ORAL; RESPIRATORY (INHALATION) at 11:16

## 2022-10-10 RX ADMIN — CEFTRIAXONE 100 MILLIGRAM(S): 500 INJECTION, POWDER, FOR SOLUTION INTRAMUSCULAR; INTRAVENOUS at 05:18

## 2022-10-10 RX ADMIN — BUDESONIDE AND FORMOTEROL FUMARATE DIHYDRATE 2 PUFF(S): 160; 4.5 AEROSOL RESPIRATORY (INHALATION) at 05:18

## 2022-10-10 RX ADMIN — Medication 650 MILLIGRAM(S): at 20:03

## 2022-10-10 RX ADMIN — Medication 30 MILLIGRAM(S): at 05:18

## 2022-10-10 RX ADMIN — SENNA PLUS 2 TABLET(S): 8.6 TABLET ORAL at 21:11

## 2022-10-10 RX ADMIN — AZITHROMYCIN 255 MILLIGRAM(S): 500 TABLET, FILM COATED ORAL at 05:49

## 2022-10-10 RX ADMIN — Medication 650 MILLIGRAM(S): at 21:10

## 2022-10-10 RX ADMIN — Medication 1 PATCH: at 19:51

## 2022-10-10 RX ADMIN — BUDESONIDE AND FORMOTEROL FUMARATE DIHYDRATE 2 PUFF(S): 160; 4.5 AEROSOL RESPIRATORY (INHALATION) at 18:22

## 2022-10-10 RX ADMIN — Medication 40 MILLIGRAM(S): at 21:11

## 2022-10-10 RX ADMIN — Medication 40 MILLIGRAM(S): at 05:18

## 2022-10-10 RX ADMIN — Medication 3 MILLIGRAM(S): at 21:11

## 2022-10-10 RX ADMIN — Medication 1 PATCH: at 14:09

## 2022-10-10 NOTE — PHARMACOTHERAPY INTERVENTION NOTE - COMMENTS
Recommended to discontinue ceftriaxone 1 g once daily as patient's chest x ray was showing no active pulmonary infection.
Recommended to change azithromycin 500 mg IV once daily to azithromycin 500 mg by mouth once daily as patient is tolerating other oral medications

## 2022-10-10 NOTE — PROGRESS NOTE ADULT - ASSESSMENT
This is a 72 y/o M active smoker with PMHx of asthma, COPD, emphysema, presents due to worsening sob x4 days. At baseline patient does not require O2 and has never been intubated. was recently prescribed penicillin for presumed PNA by pcp. denies sick contacts, f/c, h/a, cough, cp, palpitations, abd pain, n/v/d/c, syncope. no recent steroid use. presenting vitals 162/99 hypoxic on RA placed on 12 L NRB. tachy 146 bpm and tachypneic 26. wbc 21.6, abg compensated respiratory acidosis co2 59. Given azithro, rocephin, nebs and medrol in ED    acute hypoxic hypercapneic respiratory failure   acute copd exacerbation   cap   sepsis  htn   -azithro, rocephin  -nebs, symbicort, medrol  -f/u cultures   -supplemental O2 for sat goal> 92%   -start nifedipine 30 d   -regular diet, scds

## 2022-10-11 LAB
ANION GAP SERPL CALC-SCNC: 5 MMOL/L — SIGNIFICANT CHANGE UP (ref 5–17)
BUN SERPL-MCNC: 37 MG/DL — HIGH (ref 7–23)
CALCIUM SERPL-MCNC: 9.4 MG/DL — SIGNIFICANT CHANGE UP (ref 8.5–10.1)
CHLORIDE SERPL-SCNC: 95 MMOL/L — LOW (ref 96–108)
CO2 SERPL-SCNC: 37 MMOL/L — HIGH (ref 22–31)
CREAT SERPL-MCNC: 0.83 MG/DL — SIGNIFICANT CHANGE UP (ref 0.5–1.3)
EGFR: 95 ML/MIN/1.73M2 — SIGNIFICANT CHANGE UP
GLUCOSE SERPL-MCNC: 116 MG/DL — HIGH (ref 70–99)
HCT VFR BLD CALC: 39.7 % — SIGNIFICANT CHANGE UP (ref 39–50)
HGB BLD-MCNC: 12.5 G/DL — LOW (ref 13–17)
MAGNESIUM SERPL-MCNC: 2.8 MG/DL — HIGH (ref 1.6–2.6)
MCHC RBC-ENTMCNC: 26.1 PG — LOW (ref 27–34)
MCHC RBC-ENTMCNC: 31.5 G/DL — LOW (ref 32–36)
MCV RBC AUTO: 82.9 FL — SIGNIFICANT CHANGE UP (ref 80–100)
NRBC # BLD: 0 /100 WBCS — SIGNIFICANT CHANGE UP (ref 0–0)
NT-PROBNP SERPL-SCNC: 51 PG/ML — SIGNIFICANT CHANGE UP (ref 0–125)
PLATELET # BLD AUTO: 522 K/UL — HIGH (ref 150–400)
POTASSIUM SERPL-MCNC: 5 MMOL/L — SIGNIFICANT CHANGE UP (ref 3.5–5.3)
POTASSIUM SERPL-SCNC: 5 MMOL/L — SIGNIFICANT CHANGE UP (ref 3.5–5.3)
RBC # BLD: 4.79 M/UL — SIGNIFICANT CHANGE UP (ref 4.2–5.8)
RBC # FLD: 14.7 % — HIGH (ref 10.3–14.5)
SODIUM SERPL-SCNC: 137 MMOL/L — SIGNIFICANT CHANGE UP (ref 135–145)
WBC # BLD: 16.6 K/UL — HIGH (ref 3.8–10.5)
WBC # FLD AUTO: 16.6 K/UL — HIGH (ref 3.8–10.5)

## 2022-10-11 PROCEDURE — 99233 SBSQ HOSP IP/OBS HIGH 50: CPT

## 2022-10-11 PROCEDURE — 71275 CT ANGIOGRAPHY CHEST: CPT | Mod: 26

## 2022-10-11 PROCEDURE — 99223 1ST HOSP IP/OBS HIGH 75: CPT

## 2022-10-11 PROCEDURE — 74177 CT ABD & PELVIS W/CONTRAST: CPT | Mod: 26

## 2022-10-11 RX ORDER — ACETYLCYSTEINE 200 MG/ML
4 VIAL (ML) MISCELLANEOUS EVERY 6 HOURS
Refills: 0 | Status: DISCONTINUED | OUTPATIENT
Start: 2022-10-11 | End: 2022-10-13

## 2022-10-11 RX ORDER — ALPRAZOLAM 0.25 MG
0.25 TABLET ORAL ONCE
Refills: 0 | Status: DISCONTINUED | OUTPATIENT
Start: 2022-10-11 | End: 2022-10-11

## 2022-10-11 RX ORDER — CEFPODOXIME PROXETIL 100 MG
200 TABLET ORAL EVERY 12 HOURS
Refills: 0 | Status: DISCONTINUED | OUTPATIENT
Start: 2022-10-11 | End: 2022-10-11

## 2022-10-11 RX ORDER — PIPERACILLIN AND TAZOBACTAM 4; .5 G/20ML; G/20ML
3.38 INJECTION, POWDER, LYOPHILIZED, FOR SOLUTION INTRAVENOUS ONCE
Refills: 0 | Status: COMPLETED | OUTPATIENT
Start: 2022-10-11 | End: 2022-10-11

## 2022-10-11 RX ORDER — IPRATROPIUM/ALBUTEROL SULFATE 18-103MCG
3 AEROSOL WITH ADAPTER (GRAM) INHALATION ONCE
Refills: 0 | Status: COMPLETED | OUTPATIENT
Start: 2022-10-11 | End: 2022-10-11

## 2022-10-11 RX ORDER — IPRATROPIUM/ALBUTEROL SULFATE 18-103MCG
3 AEROSOL WITH ADAPTER (GRAM) INHALATION EVERY 6 HOURS
Refills: 0 | Status: DISCONTINUED | OUTPATIENT
Start: 2022-10-11 | End: 2022-10-13

## 2022-10-11 RX ORDER — PIPERACILLIN AND TAZOBACTAM 4; .5 G/20ML; G/20ML
3.38 INJECTION, POWDER, LYOPHILIZED, FOR SOLUTION INTRAVENOUS EVERY 8 HOURS
Refills: 0 | Status: DISCONTINUED | OUTPATIENT
Start: 2022-10-11 | End: 2022-10-12

## 2022-10-11 RX ORDER — ONDANSETRON 8 MG/1
4 TABLET, FILM COATED ORAL ONCE
Refills: 0 | Status: COMPLETED | OUTPATIENT
Start: 2022-10-11 | End: 2022-10-11

## 2022-10-11 RX ORDER — ENOXAPARIN SODIUM 100 MG/ML
40 INJECTION SUBCUTANEOUS EVERY 12 HOURS
Refills: 0 | Status: DISCONTINUED | OUTPATIENT
Start: 2022-10-11 | End: 2022-10-13

## 2022-10-11 RX ADMIN — PIPERACILLIN AND TAZOBACTAM 200 GRAM(S): 4; .5 INJECTION, POWDER, LYOPHILIZED, FOR SOLUTION INTRAVENOUS at 11:26

## 2022-10-11 RX ADMIN — Medication 4 MILLILITER(S): at 11:01

## 2022-10-11 RX ADMIN — Medication 1 PATCH: at 11:18

## 2022-10-11 RX ADMIN — Medication 30 MILLILITER(S): at 04:12

## 2022-10-11 RX ADMIN — PIPERACILLIN AND TAZOBACTAM 25 GRAM(S): 4; .5 INJECTION, POWDER, LYOPHILIZED, FOR SOLUTION INTRAVENOUS at 13:42

## 2022-10-11 RX ADMIN — BUDESONIDE AND FORMOTEROL FUMARATE DIHYDRATE 2 PUFF(S): 160; 4.5 AEROSOL RESPIRATORY (INHALATION) at 05:17

## 2022-10-11 RX ADMIN — POLYETHYLENE GLYCOL 3350 17 GRAM(S): 17 POWDER, FOR SOLUTION ORAL at 11:32

## 2022-10-11 RX ADMIN — TIOTROPIUM BROMIDE 1 CAPSULE(S): 18 CAPSULE ORAL; RESPIRATORY (INHALATION) at 11:32

## 2022-10-11 RX ADMIN — Medication 0.25 MILLIGRAM(S): at 12:01

## 2022-10-11 RX ADMIN — Medication 3 MILLILITER(S): at 23:53

## 2022-10-11 RX ADMIN — Medication 4 MILLILITER(S): at 05:34

## 2022-10-11 RX ADMIN — Medication 3 MILLILITER(S): at 19:28

## 2022-10-11 RX ADMIN — Medication 30 MILLIGRAM(S): at 05:17

## 2022-10-11 RX ADMIN — Medication 1 PATCH: at 19:11

## 2022-10-11 RX ADMIN — Medication 3 MILLILITER(S): at 05:35

## 2022-10-11 RX ADMIN — ONDANSETRON 4 MILLIGRAM(S): 8 TABLET, FILM COATED ORAL at 19:00

## 2022-10-11 RX ADMIN — Medication 40 MILLIGRAM(S): at 05:17

## 2022-10-11 RX ADMIN — Medication 1 PATCH: at 07:44

## 2022-10-11 RX ADMIN — BUDESONIDE AND FORMOTEROL FUMARATE DIHYDRATE 2 PUFF(S): 160; 4.5 AEROSOL RESPIRATORY (INHALATION) at 17:13

## 2022-10-11 RX ADMIN — SENNA PLUS 2 TABLET(S): 8.6 TABLET ORAL at 21:21

## 2022-10-11 RX ADMIN — Medication 650 MILLIGRAM(S): at 13:41

## 2022-10-11 RX ADMIN — Medication 40 MILLIGRAM(S): at 13:43

## 2022-10-11 RX ADMIN — Medication 650 MILLIGRAM(S): at 14:40

## 2022-10-11 RX ADMIN — Medication 1 PATCH: at 11:27

## 2022-10-11 RX ADMIN — PIPERACILLIN AND TAZOBACTAM 25 GRAM(S): 4; .5 INJECTION, POWDER, LYOPHILIZED, FOR SOLUTION INTRAVENOUS at 21:21

## 2022-10-11 RX ADMIN — Medication 4 MILLILITER(S): at 23:53

## 2022-10-11 RX ADMIN — Medication 40 MILLIGRAM(S): at 21:21

## 2022-10-11 RX ADMIN — Medication 3 MILLILITER(S): at 17:05

## 2022-10-11 RX ADMIN — Medication 3 MILLILITER(S): at 11:01

## 2022-10-11 RX ADMIN — Medication 200 MILLIGRAM(S): at 05:17

## 2022-10-11 RX ADMIN — ENOXAPARIN SODIUM 40 MILLIGRAM(S): 100 INJECTION SUBCUTANEOUS at 21:21

## 2022-10-11 RX ADMIN — Medication 4 MILLILITER(S): at 17:04

## 2022-10-11 RX ADMIN — Medication 0.25 MILLIGRAM(S): at 21:21

## 2022-10-11 RX ADMIN — ENOXAPARIN SODIUM 40 MILLIGRAM(S): 100 INJECTION SUBCUTANEOUS at 11:26

## 2022-10-11 NOTE — PROGRESS NOTE ADULT - ASSESSMENT
This is a 70 y/o M active smoker with PMHx of asthma, COPD, emphysema, presents due to worsening sob x4 days. At baseline patient does not require O2 and has never been intubated. was recently prescribed penicillin for presumed PNA by pcp. denies sick contacts, f/c, h/a, cough, cp, palpitations, abd pain, n/v/d/c, syncope. no recent steroid use. presenting vitals 162/99 hypoxic on RA placed on 12 L NRB. tachy 146 bpm and tachypneic 26. wbc 21.6, abg compensated respiratory acidosis co2 59. Given azithro, rocephin, nebs and medrol in ED    acute hypoxic hypercapneic respiratory failure  due to 1. acute copd exacerbation 2. CAP 3 other ddx (infectious malignancy)  sepsis POA  -nebs, symbicort, medrol  -CTA noted   plan:  -nebs, symbicort, medrol  =Pulm and ID conslted  -change rocephin to zosyn  -supplemental O2 for sat goal> 92%     htn   -start nifedipine 30 d     -regular diet, scds

## 2022-10-11 NOTE — CONSULT NOTE ADULT - SUBJECTIVE AND OBJECTIVE BOX
Full note to follow  3 small cavities in L lung, no surrounding infiltrates  Patient recently undomiciled but prior lived in a basement apartment for 15 years  No fevers, chills, or rigors. No weight loss. No night sweats.   No hx of TB or prior exposure  Has increasing SOB after eating, states has GERD Sx and also states has HH  No clinical suspicion for TB  Role of Zosyn unclear. Chest CT does not look like active pneumonia to me, await pulm input.  Left message for Dr. Crenshaw  Thank you for the courtesy of this referral.  Taras Bustos MD  Attending Physician  [facility]  Division of Infectious Diseases  [phone]  -----------------  [facility]  [phone]    GURU HAILE  71y, Male  [id number]    HPI--      PMH/PSH--  S/P hip replacement, bilateral        Allergies--  No Known Allergies      Medications--  Antibiotics: piperacillin/tazobactam IVPB.- 3.375 Gram(s) IV Intermittent once  piperacillin/tazobactam IVPB.. 3.375 Gram(s) IV Intermittent every 8 hours    Immunologic:   Other: acetaminophen     Tablet .. PRN  acetylcysteine 10%  Inhalation  albuterol/ipratropium for Nebulization  aluminum hydroxide/magnesium hydroxide/simethicone Suspension PRN  bisacodyl PRN  budesonide 160 MICROgram(s)/formoterol 4.5 MICROgram(s) Inhaler  enoxaparin Injectable  melatonin PRN  methylPREDNISolone sodium succinate Injectable  nicotine - 21 mG/24Hr(s) Patch  NIFEdipine XL  ondansetron Injectable PRN  polyethylene glycol 3350  senna  tiotropium 18 MICROgram(s) Capsule    Antimicrobials last 90 days per EMR: MEDICATIONS  (STANDING):    azithromycin  IVPB   255 mL/Hr IV Intermittent (10-08-22 @ 23:26)    azithromycin  IVPB   255 mL/Hr IV Intermittent (10-10-22 @ 05:49)   255 mL/Hr IV Intermittent (10-09-22 @ 06:15)    cefpodoxime   200 milliGRAM(s) Oral (10-11-22 @ 05:17)    cefTRIAXone   IVPB   100 mL/Hr IV Intermittent (10-10-22 @ 05:18)   100 mL/Hr IV Intermittent (10-09-22 @ 05:25)    cefTRIAXone   IVPB   100 mL/Hr IV Intermittent (10-08-22 @ 22:51)    piperacillin/tazobactam IVPB.   200 mL/Hr IV Intermittent (10-11-22 @ 11:26)        Social History--  EtOH: denies   Tobacco: denies   Drug Use: denies     Family/Marital History--  No pertinent family history in first degree relatives          Travel/Environmental/Occupational History:      Review of Systems:  A >=10-point review of systems was obtained.     Pertinent positives and negatives--  Constitutional: No fevers. No Chills. No Rigors.   Eyes:  ENMT:  Cardiovascular: No chest pain. No palpitations.  Respiratory: No shortness of breath. No cough.  Gastrointestinal: No nausea or vomiting. No diarrhea or constipation.   Genitourinary:  Musculoskeletal:  Skin:  Neurologic:  Psychiatric: Pleasant. Appropriate affect.  Endocrine:  Heme/Lymphatic:  Allergy/Immunologic:    Review of systems otherwise negative except as previously noted.    Physical Exam--  Vital Signs: T(F): 97.5 (10-11-22 @ 10:00), Max: 98.7 (10-10-22 @ 17:22)  HR: 96 (10-11-22 @ 11:18)  BP: 131/79 (10-11-22 @ 10:00)  RR: 22 (10-11-22 @ 10:00)  SpO2: 93% (10-11-22 @ 11:18)  Wt(kg): --  General: Nontoxic-appearing Male in no acute distress.  HEENT: AT/NC. PERRL. EOMI. Anicteric. Conjunctiva pink and moist. Oropharynx clear. Dentition fair.  Neck: Not rigid. No sense of mass.  Nodes: None palpable.  Lungs: Clear bilaterally without rales, wheezing or rhonchi  Heart: Regular rate and rhythm. No Murmur. No rub. No gallop. No palpable thrill.  Abdomen: Bowel sounds present and normoactive. Soft. Nondistended. Nontender. No sense of mass. No organomegaly.  Back: No spinal tenderness. No costovertebral angle tenderness.   Extremities: No cyanosis or clubbing. No edema.   Skin: Warm. Dry. Good turgor. No rash. No vasculitic stigmata.  Psychiatric: Appropriate affect and mood for situation.         Laboratory & Imaging Data--  CBC                        12.5   16.60 )-----------( 522      ( 11 Oct 2022 05:55 )             39.7       Chemistries  10-11    137  |  95<L>  |  37<H>  ----------------------------<  116<H>  5.0   |  37<H>  |  0.83    Ca    9.4      11 Oct 2022 05:55  Mg     2.8     10-11        Culture Data    Culture - Urine (collected 09 Oct 2022 09:34)  Source: Clean Catch Clean Catch (Midstream)  Final Report (10 Oct 2022 10:46):    <10,000 CFU/mL Normal Urogenital Val    Culture - Blood (collected 08 Oct 2022 19:40)  Source: .Blood Blood-Peripheral  Preliminary Report (10 Oct 2022 01:03):    No growth to date.    Culture - Blood (collected 08 Oct 2022 19:30)  Source: .Blood Blood-Peripheral  Preliminary Report (10 Oct 2022 01:03):    No growth to date.    < from: CT Angio Chest PE Protocol w/ IV Cont (10.11.22 @ 07:03) >    ACC: [id number] EXAM:  CT ANGIO CHEST PULM [facility]                        ACC: [id number] EXAM:  CT ABDOMEN AND PELVIS IC                          PROCEDURE DATE:  10/11/2022          INTERPRETATION:  CLINICAL INFORMATION: 71-year-old male patient with   abdominal pain and hernia    COMPARISON: None.    CONTRAST/COMPLICATIONS:  IV Contrast: IV contrast documented in associated exam (accession   [id number]), Omnipaque 350 (accession [id number])  90 cc administered   10 cc   discarded  Oral Contrast: NONE  Complications: None reported at time of study completion    PROCEDURE:  CT Angiography of the Chest was performed followed by portal venous phase   imaging of the Abdomen and Pelvis.  Sagittal and coronal reformats were performed as well as 3D (MIP)   reconstructions.    FINDINGS:  CHEST:  LUNGS AND LARGE AIRWAYS: Patent central airways. Centrilobular   emphysematous changes are present, predominantly in the upper lobes. Left   upper lobe solitary pulmonary nodule measuring 6 mm, image 6 -39 and   adjacent thick-walled cavitary lesion measuring 1 cm, image 6-42.   Additional thick-walled cavitary lesions are seen in the apical left   lower lobe measuring 1.1 cm, image 6-40.  Peribronchial thickening involving the segmental lower lobes. Mucous   deposits in the segmental branches of the left lower lobe with   atelectatic changes. No consolidative infiltrates. Bihilar peribronchial   thickening and small hilar lymph nodes are noted.  PLEURA: No pleural effusion.  VESSELS: Normal caliberascending thoracic aorta and antrum. There is no   pulmonary embolism. No dissection.  HEART: Heart size is normal. No pericardial effusion.  MEDIASTINUM AND JEFF: No lymphadenopathy. Right more than left   subcentimeter hilar lymph nodes. Moderate-sized hiatal hernia. No   obstruction.  CHEST WALL AND LOWER NECK: Slightly heterogeneous thyroid gland.    ABDOMEN AND PELVIS:  LIVER: No focal liver lesions.  BILE DUCTS: Normal caliber.  GALLBLADDER: Within normal limits.  SPLEEN: Within normal limits.  PANCREAS: Within normal limits.  ADRENALS: Within normal limits.  KIDNEYS/URETERS: Evaluation limited by motion artifact. Within these   limits no hydronephrosis, no nephroureterolithiasis, no enhancing masses.    BLADDER: Limited evaluation due to overlying beam hardening artifacts.  REPRODUCTIVE ORGANS: Prostate with calcifications.    BOWEL: Moderate sized hiatal hernia. No obstruction. No bowel   obstruction. Appendix is normal.Sigmoid colonic diverticulosis. Within   the limits of motionartifact no discrete sigmoid diverticulitis.  PERITONEUM: No ascites. No loculated fluid collections or   pneumoperitoneum.  VESSELS: Atherosclerotic changes.  RETROPERITONEUM/LYMPH NODES: No lymphadenopathy.  ABDOMINAL WALL: No evidence of an abdominal wall hernia or inguinal   hernia.  BONES: Remote consolidated rib fractures of the 10th and 11th right   posterior rib. Hyperkyphosis. No compression deformity of vertebrae.   Discogenic degenerative disease in the lower cervical spine. Bilateral   hip arthroplasty.    IMPRESSION:  No acute pulmonary embolism.    Peribronchial thickening in the lower lobes more than upper lobe and   numerous thick-walled cavitary lesions in the left upper and lower lobe   up to 1.1 cm concerning for sequela from infectious disease.Centrilobular   emphysema.    Moderate sized hiatal hernia without obstruction.    Uncomplicated sigmoid colonic diverticulosis.    --- End of Report ---            EMMANUEL WEISS MD; Attending Radiologist  This document has been electronically signed. Oct 11 2022  8:55AM    < end of copied text >       Full note to follow  3 small cavities in L lung, no surrounding infiltrates  Patient recently undomiciled but prior lived in a basement apartment for 15 years  No fevers, chills, or rigors. No weight loss. No night sweats.   No hx of TB or prior exposure  Has increasing SOB after eating, states has GERD Sx and also states has HH  No clinical suspicion for TB  Role of Zosyn unclear. Chest CT does not look like active pneumonia to me, await pulm input.  Left message for Dr. Crenshaw  Thank you for the courtesy of this referral.  Taras Bustos MD  Attending Physician  [facility]  Division of Infectious Diseases  [phone]  -----------------  [facility]  Division of Infectious Diseases  [phone]    HAILE GARVEY  71y, Male  [id number]    HPI--  71M hx COPD/Asthma, B THR, HH, blind L eye, Deaf L ear, speech impediment, lost his housing 1 week ago and currently living in a shelter hotel, admitted with SOB and managed for ?pneumonia and COPD exacerbation. Hypoxia somewhat improved, though at the time of assessment patient was not wearing O2 and SaO2 was 87%. Despite this he was completely comfortable.     Patient has had COPD exacerbations prior, followed by pulmonary at [facility] and occasionally gets prednisone/antibiotics/inhalers. Patient states received "penicillin" from his PCP for pneumonia. Here CXR unrevealing for infiltrates. CT subsequently obtained revealed 3 small cavities in L lung (personally reviewed) and consult called for this reason. Denies incarceration, prior TB diagnosis, or TB exposure. Used to work in building maintenance. Denies sick contacts. No pets of his own but the house he used to live in had a dog. patient also states he gets SOB after he eats and gets GERD symptoms with acid taste in mouth and some chest discomfort. No fevers, chills, or rigors. No night sweats. No weight loss.       PMH/PSH--  S/P hip replacement, bilateral        Allergies--  No Known Allergies      Medications--  Antibiotics: piperacillin/tazobactam IVPB.- 3.375 Gram(s) IV Intermittent once  piperacillin/tazobactam IVPB.. 3.375 Gram(s) IV Intermittent every 8 hours    Immunologic:   Other: acetaminophen     Tablet .. PRN  acetylcysteine 10%  Inhalation  albuterol/ipratropium for Nebulization  aluminum hydroxide/magnesium hydroxide/simethicone Suspension PRN  bisacodyl PRN  budesonide 160 MICROgram(s)/formoterol 4.5 MICROgram(s) Inhaler  enoxaparin Injectable  melatonin PRN  methylPREDNISolone sodium succinate Injectable  nicotine - 21 mG/24Hr(s) Patch  NIFEdipine XL  ondansetron Injectable PRN  polyethylene glycol 3350  senna  tiotropium 18 MICROgram(s) Capsule    Antimicrobials last 90 days per EMR: MEDICATIONS  (STANDING):    azithromycin  IVPB   255 mL/Hr IV Intermittent (10-08-22 @ 23:26)    azithromycin  IVPB   255 mL/Hr IV Intermittent (10-10-22 @ 05:49)   255 mL/Hr IV Intermittent (10-09-22 @ 06:15)    cefpodoxime   200 milliGRAM(s) Oral (10-11-22 @ 05:17)    cefTRIAXone   IVPB   100 mL/Hr IV Intermittent (10-10-22 @ 05:18)   100 mL/Hr IV Intermittent (10-09-22 @ 05:25)    cefTRIAXone   IVPB   100 mL/Hr IV Intermittent (10-08-22 @ 22:51)    piperacillin/tazobactam IVPB.   200 mL/Hr IV Intermittent (10-11-22 @ 11:26)        Social History--  EtOH: denies   Tobacco: smokes 1ppd  Drug Use: denies     Family/Marital History--  Not . No children  No pertinent family history in first degree relatives      Travel/Environmental/Occupational History:  As above  Resided in basement apt for 15 y prior to being undomiciled    Review of Systems:  A >=10-point review of systems was obtained.   Review of systems otherwise negative except as previously noted.    Physical Exam--  Vital Signs: T(F): 97.5 (10-11-22 @ 10:00), Max: 98.7 (10-10-22 @ 17:22)  HR: 96 (10-11-22 @ 11:18)  BP: 131/79 (10-11-22 @ 10:00)  RR: 22 (10-11-22 @ 10:00)  SpO2: 93% (10-11-22 @ 11:18)  Wt(kg): --  General: Nontoxic-appearing Male in no acute distress.  HEENT: AT/NC. Anicteric. Conjunctiva pink and moist. Oropharynx clear. Dentition absent- dentures.   Neck: Not rigid. No sense of mass.  Nodes: None palpable.  Lungs: Diminished BS B no RWR  Heart: Regular rate and rhythm.   Abdomen: Bowel sounds present and normoactive. Soft. Nondistended. Nontender. No sense of mass. No organomegaly.  Back: No spinal tenderness. No costovertebral angle tenderness.   Extremities: No cyanosis or clubbing. No edema.   Skin: Warm. Dry. Good turgor. No rash. No vasculitic stigmata.  Psychiatric: Appropriate affect and mood for situation.       Laboratory & Imaging Data--  CBC                        12.5   16.60 )-----------( 522      ( 11 Oct 2022 05:55 )             39.7     WBC Count: 14.30 K/uL (10-09-22 @ 07:05)  WBC Count: 21.62 K/uL (10-08-22 @ 19:33)      Chemistries  10-11    137  |  95<L>  |  37<H>  ----------------------------<  116<H>  5.0   |  37<H>  |  0.83    Ca    9.4      11 Oct 2022 05:55  Mg     2.8     10-11        Culture Data    Culture - Urine (collected 09 Oct 2022 09:34)  Source: Clean Catch Clean Catch (Midstream)  Final Report (10 Oct 2022 10:46):    <10,000 CFU/mL Normal Urogenital Val    Culture - Blood (collected 08 Oct 2022 19:40)  Source: .Blood Blood-Peripheral  Preliminary Report (10 Oct 2022 01:03):    No growth to date.    Culture - Blood (collected 08 Oct 2022 19:30)  Source: .Blood Blood-Peripheral  Preliminary Report (10 Oct 2022 01:03):    No growth to date.    < from: CT Angio Chest PE Protocol w/ IV Cont (10.11.22 @ 07:03) >    ACC: [id number] EXAM:  CT ANGIO CHEST PULM [facility]                        ACC: [id number] EXAM:  CT ABDOMEN AND PELVIS IC                          PROCEDURE DATE:  10/11/2022          INTERPRETATION:  CLINICAL INFORMATION: 71-year-old male patient with   abdominal pain and hernia    COMPARISON: None.    CONTRAST/COMPLICATIONS:  IV Contrast: IV contrast documented in associated exam (accession   [id number]), Omnipaque 350 (accession [id number])  90 cc administered   10 cc   discarded  Oral Contrast: NONE  Complications: None reported at time of study completion    PROCEDURE:  CT Angiography of the Chest was performed followed by portal venous phase   imaging of the Abdomen and Pelvis.  Sagittal and coronal reformats were performed as well as 3D (MIP)   reconstructions.    FINDINGS:  CHEST:  LUNGS AND LARGE AIRWAYS: Patent central airways. Centrilobular   emphysematous changes are present, predominantly in the upper lobes. Left   upper lobe solitary pulmonary nodule measuring 6 mm, image 6 -39 and   adjacent thick-walled cavitary lesion measuring 1 cm, image 6-42.   Additional thick-walled cavitary lesions are seen in the apical left   lower lobe measuring 1.1 cm, image 6-40.  Peribronchial thickening involving the segmental lower lobes. Mucous   deposits in the segmental branches of the left lower lobe with   atelectatic changes. No consolidative infiltrates. Bihilar peribronchial   thickening and small hilar lymph nodes are noted.  PLEURA: No pleural effusion.  VESSELS: Normal caliberascending thoracic aorta and antrum. There is no   pulmonary embolism. No dissection.  HEART: Heart size is normal. No pericardial effusion.  MEDIASTINUM AND JEFF: No lymphadenopathy. Right more than left   subcentimeter hilar lymph nodes. Moderate-sized hiatal hernia. No   obstruction.  CHEST WALL AND LOWER NECK: Slightly heterogeneous thyroid gland.    ABDOMEN AND PELVIS:  LIVER: No focal liver lesions.  BILE DUCTS: Normal caliber.  GALLBLADDER: Within normal limits.  SPLEEN: Within normal limits.  PANCREAS: Within normal limits.  ADRENALS: Within normal limits.  KIDNEYS/URETERS: Evaluation limited by motion artifact. Within these   limits no hydronephrosis, no nephroureterolithiasis, no enhancing masses.    BLADDER: Limited evaluation due to overlying beam hardening artifacts.  REPRODUCTIVE ORGANS: Prostate with calcifications.    BOWEL: Moderate sized hiatal hernia. No obstruction. No bowel   obstruction. Appendix is normal.Sigmoid colonic diverticulosis. Within   the limits of motionartifact no discrete sigmoid diverticulitis.  PERITONEUM: No ascites. No loculated fluid collections or   pneumoperitoneum.  VESSELS: Atherosclerotic changes.  RETROPERITONEUM/LYMPH NODES: No lymphadenopathy.  ABDOMINAL WALL: No evidence of an abdominal wall hernia or inguinal   hernia.  BONES: Remote consolidated rib fractures of the 10th and 11th right   posterior rib. Hyperkyphosis. No compression deformity of vertebrae.   Discogenic degenerative disease in the lower cervical spine. Bilateral   hip arthroplasty.    IMPRESSION:  No acute pulmonary embolism.    Peribronchial thickening in the lower lobes more than upper lobe and   numerous thick-walled cavitary lesions in the left upper and lower lobe   up to 1.1 cm concerning for sequela from infectious disease.Centrilobular   emphysema.    Moderate sized hiatal hernia without obstruction.    Uncomplicated sigmoid colonic diverticulosis.    --- End of Report ---    EMMANUEL WEISS MD; Attending Radiologist  This document has been electronically signed. Oct 11 2022  8:55AM    < end of copied text >

## 2022-10-11 NOTE — CONSULT NOTE ADULT - SUBJECTIVE AND OBJECTIVE BOX
Patient is a 71y old  Male who presents with a chief complaint of acute hypoxemic respiratory failure (11 Oct 2022 09:50)      HPI:  This is a 72 y/o M active smoker with PMHx of asthma, COPD, emphysema, presents due to worsening sob x4 days. At baseline patient does not require O2 and has never been intubated. was recently prescribed penicillin for presumed PNA by pcp. denies sick contacts, f/c, h/a, cough, cp, palpitations, abd pain, n/v/d/c, syncope. no recent steroid use. presenting vitals 162/99 hypoxic on RA placed on 12 L NRB. tachy 146 bpm and tachypneic 26. wbc 21.6, abg compensated respiratory acidosis co2 59. Given azithro, rocephin, nebs and medrol in ED     (08 Oct 2022 23:46)      Allergies    No Known Allergies    Intolerances        MEDICATIONS  (STANDING):  acetylcysteine 10%  Inhalation 4 milliLiter(s) Inhalation every 6 hours  albuterol/ipratropium for Nebulization 3 milliLiter(s) Nebulizer every 6 hours  budesonide 160 MICROgram(s)/formoterol 4.5 MICROgram(s) Inhaler 2 Puff(s) Inhalation two times a day  enoxaparin Injectable 40 milliGRAM(s) SubCutaneous every 12 hours  methylPREDNISolone sodium succinate Injectable 40 milliGRAM(s) IV Push every 8 hours  nicotine - 21 mG/24Hr(s) Patch 1 Patch Transdermal daily  NIFEdipine XL 30 milliGRAM(s) Oral daily  piperacillin/tazobactam IVPB.. 3.375 Gram(s) IV Intermittent every 8 hours  polyethylene glycol 3350 17 Gram(s) Oral daily  senna 2 Tablet(s) Oral at bedtime  tiotropium 18 MICROgram(s) Capsule 1 Capsule(s) Inhalation daily    MEDICATIONS  (PRN):  acetaminophen     Tablet .. 650 milliGRAM(s) Oral every 6 hours PRN Temp greater or equal to 38C (100.4F), Mild Pain (1 - 3)  aluminum hydroxide/magnesium hydroxide/simethicone Suspension 30 milliLiter(s) Oral every 4 hours PRN Dyspepsia  bisacodyl 5 milliGRAM(s) Oral every 12 hours PRN Constipation  melatonin 3 milliGRAM(s) Oral at bedtime PRN Insomnia  ondansetron Injectable 4 milliGRAM(s) IV Push every 8 hours PRN Nausea and/or Vomiting            Drug Dosing Weight  Height (cm): 157.5 (08 Oct 2022 19:04)  Weight (kg): 130.2 (09 Oct 2022 01:05)  BMI (kg/m2): 52.5 (09 Oct 2022 01:05)  BSA (m2): 2.23 (09 Oct 2022 01:05)    PAST MEDICAL & SURGICAL HISTORY:  S/P hip replacement, bilateral          FAMILY HISTORY:  No pertinent family history in first degree relatives        SOCIAL HISTORY:  active smoker 1PPD   currently living in shelter for past 3 days, apartment was recently sold by [employer]      REVIEW OF SYSTEMS:    CONSTITUTIONAL: No fever, weight loss, or fatigue  EYES: No eye pain, visual disturbances, or discharge  ENMT:  No difficulty hearing, tinnitus, vertigo; No sinus or throat pain  NECK: No pain or stiffness  BREASTS: No pain, masses, or nipple discharge  RESPIRATORY: No cough, wheezing, chills or hemoptysis; No shortness of breath  CARDIOVASCULAR: No chest pain, palpitations, dizziness, or leg swelling  GASTROINTESTINAL: No abdominal or epigastric pain. No nausea, vomiting, or hematemesis; No diarrhea or constipation. No melena or hematochezia.  GENITOURINARY: No dysuria, frequency, hematuria, or incontinence  NEUROLOGICAL: No headaches, memory loss, loss of strength, numbness, or tremors  SKIN: No itching, burning, rashes, or lesions   LYMPH NODES: No enlarged glands  ENDOCRINE: No heat or cold intolerance; No hair loss  MUSCULOSKELETAL: No joint pain or swelling; No muscle, back, or extremity pain  PSYCHIATRIC: No depression, anxiety, mood swings, or difficulty sleeping  HEME/LYMPH: No easy bruising, or bleeding gums  ALLERGY AND IMMUNOLOGIC: No hives or eczema          Vital Signs Last 24 Hrs  T(F): 97.5 (10-11-22 @ 10:00), Max: 98.7 (10-10-22 @ 17:22)  HR: 96 (10-11-22 @ 11:18)  BP: 131/79 (10-11-22 @ 10:00)  RR: 22 (10-11-22 @ 10:00)  SpO2: 93% (10-11-22 @ 11:18)    Patient On (Oxygen Delivery Method): nasal cannula      PHYSICAL EXAM:  GENERAL: NAD, well-groomed, well-developed  HEAD:  Atraumatic, Normocephalic  EYES: EOMI, PERRLA, conjunctiva and sclera clear  ENMT: No tonsillar erythema, exudates, or enlargement; Moist mucous membranes, Good dentition, No lesions  NECK: Supple, No JVD, Normal thyroid  NERVOUS SYSTEM:  Alert & Oriented X3, Good concentration; Motor Strength 5/5 B/L upper and lower extremities; DTRs 2+ intact and symmetric  CHEST/LUNG: bilateral coarse breath sounds with wheeze  HEART: Regular rate and rhythm; No murmurs, rubs, or gallops  ABDOMEN: Soft, Nontender, Nondistended; Bowel sounds present  EXTREMITIES:  2+ Peripheral Pulses, No clubbing, cyanosis, or edema  LYMPH: No lymphadenopathy noted  SKIN: No rashes or lesions    LABS:  CBC Full  -  ( 11 Oct 2022 05:55 )                        12.5   16.60 )-----------( 522      ( 11 Oct 2022 05:55 )             39.7       10-11    137  |  95<L>  |  37<H>  ----------------------------<  116<H>  5.0   |  37<H>  |  0.83    Ca    9.4      11 Oct 2022 05:55  Mg     2.8     10-11    Culture - Blood (10.08.22 @ 19:40)    Specimen Source: .Blood Blood-Peripheral    Culture Results: No growth to date.    Culture - Urine (10.09.22 @ 09:34)    Specimen Source: Clean Catch Clean Catch (Midstream)    Culture Results: <10,000 CFU/mL Normal Urogenital Val        RADIOLOGY:  CXR < from: Xray Chest 1 View- PORTABLE-Urgent (Xray Chest 1 View- PORTABLE-Urgent .) (10.08.22 @ 21:29) >  No active pulmonary disease.      CT chest < from: CT Angio Chest PE Protocol w/ IV Cont (10.11.22 @ 07:03) >  No acute pulmonary embolism.    Peribronchial thickening in the lower lobes more than upper lobe and   numerous thick-walled cavitary lesions in the left upper and lower lobe   up to 1.1 cm concerning for sequela from infectious disease.Centrilobular   emphysema.    Moderate sized hiatal hernia without obstruction.    Uncomplicated sigmoid colonic diverticulosis.       Patient is a 71y old  Male who presents with a chief complaint of acute hypoxemic respiratory failure (11 Oct 2022 09:50)      HPI:  This is a 72 y/o M active smoker with PMHx of asthma, COPD, emphysema, presents due to worsening sob x4 days. At baseline patient does not require O2 and has never been intubated. was recently prescribed penicillin for presumed PNA by pcp. denies sick contacts, f/c, h/a, cough, cp, palpitations, abd pain, n/v/d/c, syncope. no recent steroid use. presenting vitals 162/99 hypoxic on RA placed on 12 L NRB. tachy 146 bpm and tachypneic 26. wbc 21.6, abg compensated respiratory acidosis co2 59. Given azithro, rocephin, nebs and medrol in ED  (08 Oct 2022 23:46)    Admitted to medical service for COPD exacerbation. Asked to evaluate patient with abnormal CT chest and for pulmonary management as pt still tachycardic/tachypneic/hypoxic with exertion    Allergies  No Known Allergies      MEDICATIONS  (STANDING):  acetylcysteine 10%  Inhalation 4 milliLiter(s) Inhalation every 6 hours  albuterol/ipratropium for Nebulization 3 milliLiter(s) Nebulizer every 6 hours  budesonide 160 MICROgram(s)/formoterol 4.5 MICROgram(s) Inhaler 2 Puff(s) Inhalation two times a day  enoxaparin Injectable 40 milliGRAM(s) SubCutaneous every 12 hours  methylPREDNISolone sodium succinate Injectable 40 milliGRAM(s) IV Push every 8 hours  nicotine - 21 mG/24Hr(s) Patch 1 Patch Transdermal daily  NIFEdipine XL 30 milliGRAM(s) Oral daily  piperacillin/tazobactam IVPB.. 3.375 Gram(s) IV Intermittent every 8 hours  polyethylene glycol 3350 17 Gram(s) Oral daily  senna 2 Tablet(s) Oral at bedtime  tiotropium 18 MICROgram(s) Capsule 1 Capsule(s) Inhalation daily    MEDICATIONS  (PRN):  acetaminophen     Tablet .. 650 milliGRAM(s) Oral every 6 hours PRN Temp greater or equal to 38C (100.4F), Mild Pain (1 - 3)  aluminum hydroxide/magnesium hydroxide/simethicone Suspension 30 milliLiter(s) Oral every 4 hours PRN Dyspepsia  bisacodyl 5 milliGRAM(s) Oral every 12 hours PRN Constipation  melatonin 3 milliGRAM(s) Oral at bedtime PRN Insomnia  ondansetron Injectable 4 milliGRAM(s) IV Push every 8 hours PRN Nausea and/or Vomiting            Drug Dosing Weight  Height (cm): 157.5 (08 Oct 2022 19:04)  Weight (kg): 130.2 (09 Oct 2022 01:05)  BMI (kg/m2): 52.5 (09 Oct 2022 01:05)  BSA (m2): 2.23 (09 Oct 2022 01:05)      PAST MEDICAL & SURGICAL HISTORY:  S/P hip replacement, bilateral    COPD not O2 dependent, no intubations    Asthma    hiatal hernia    speech impairment    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    L eye blindness      FAMILY HISTORY:  No pertinent family history in first degree relatives        SOCIAL HISTORY:  active smoker 1PPD   currently living in shelter for past 3 days, apartment was recently sold by [employer]      REVIEW OF SYSTEMS:  CONSTITUTIONAL: No fever, weight loss, or fatigue  EYES: No eye pain, L eye blindness, or discharge  ENMT:  + hard of hearing, no tinnitus, No sinus or throat pain  NECK: No pain or stiffness  RESPIRATORY: + cough, +wheezing, - hemoptysis; + shortness of breath  CARDIOVASCULAR: No chest pain, palpitations  GASTROINTESTINAL: No abdominal or epigastric pain. No nausea, + vomiting with eating , + dysphagia   GENITOURINARY: No dysuria, frequency, incontinence  NEUROLOGICAL: No headaches, memory loss, loss of strength, numbness  SKIN: No itching, burning, rashes, or lesions   ENDOCRINE: No heat or cold intolerance  MUSCULOSKELETAL: No joint pain or swelling; No muscle, back, or extremity pain  PSYCHIATRIC: No depression, anxiety  HEME/LYMPH: No easy bruising, or bleeding   ALLERGY AND IMMUNOLOGIC: No hives or eczema          Vital Signs Last 24 Hrs  T(F): 97.5 (10-11-22 @ 10:00), Max: 98.7 (10-10-22 @ 17:22)  HR: 96 (10-11-22 @ 11:18)  BP: 131/79 (10-11-22 @ 10:00)  RR: 22 (10-11-22 @ 10:00)  SpO2: 93% (10-11-22 @ 11:18)    Patient On (Oxygen Delivery Method): nasal cannula      PHYSICAL EXAM:  GENERAL: NAD, laying in left lateral recumbent position in bed  HEAD:  Normocephalic  EYES: conjunctiva and sclera clear  ENMT: No tonsillar erythema, exudates  NECK: Supple, No JVD  NERVOUS SYSTEM:  Alert & Oriented X3, Good concentration; Motor Strength 5/5 B/L upper and lower extremities  CHEST/LUNG: bilateral coarse breath sounds with wheeze  HEART: Regular rate and rhythm  ABDOMEN: Soft, Nontender, Nondistended; Bowel sounds present  EXTREMITIES:  2+ Peripheral Pulses, No clubbing, cyanosis, or edema  LYMPH: No lymphadenopathy noted  SKIN: No rashes or lesions      LABS:  CBC Full  -  ( 11 Oct 2022 05:55 )                        12.5   16.60 )-----------( 522      ( 11 Oct 2022 05:55 )             39.7       10-11    137  |  95<L>  |  37<H>  ----------------------------<  116<H>  5.0   |  37<H>  |  0.83    Ca    9.4      11 Oct 2022 05:55  Mg     2.8     10-11    Culture - Blood (10.08.22 @ 19:40)    Specimen Source: .Blood Blood-Peripheral    Culture Results: No growth to date.    Culture - Urine (10.09.22 @ 09:34)    Specimen Source: Clean Catch Clean Catch (Midstream)    Culture Results: <10,000 CFU/mL Normal Urogenital Vla    Flu With COVID-19 By RAMANDEEP (10.08.22 @ 19:33)    SARS-CoV-2 Result: NotDetec: EUA/IVD    Influenza A Result: NotDetec: EUA/IVD    Influenza B Result: NotDetec: EUA/IVD        RADIOLOGY:  CXR < from: Xray Chest 1 View- PORTABLE-Urgent (Xray Chest 1 View- PORTABLE-Urgent .) (10.08.22 @ 21:29) >  No active pulmonary disease.      CT chest < from: CT Angio Chest PE Protocol w/ IV Cont (10.11.22 @ 07:03) >  No acute pulmonary embolism.    Peribronchial thickening in the lower lobes more than upper lobe and   numerous thick-walled cavitary lesions in the left upper and lower lobe   up to 1.1 cm concerning for sequela from infectious disease.Centrilobular   emphysema.    Moderate sized hiatal hernia without obstruction.    Uncomplicated sigmoid colonic diverticulosis.

## 2022-10-11 NOTE — CONSULT NOTE ADULT - ASSESSMENT
No clinical concern that these cavities represent active disease, much less active TB.  Suspect presentation mostly COPD exacerbation  Role of Zosyn unclear.     Suggestions  Await pulm input  Supplemental O2  Steroids  Consider full RVP- if positive for RSV, HMPV etc., would from my perspective obviate the need for Abx  No role for airborne isolaltion  Role of Zosyn TBD  Will need serial imaging to assess stability of cavities    Thank you for the courtesy of this referral.    Taras Bustos MD  Attending Physician  [facility]  Division of Infectious Diseases  [phone]

## 2022-10-11 NOTE — CONSULT NOTE ADULT - TIME BILLING
review of chart, medications, blood work, imaging, vitals, direct patient care, d/w hospitalist/ID/patient

## 2022-10-12 LAB
ALBUMIN SERPL ELPH-MCNC: 2.7 G/DL — LOW (ref 3.3–5)
ALP SERPL-CCNC: 76 U/L — SIGNIFICANT CHANGE UP (ref 40–120)
ALT FLD-CCNC: 15 U/L — SIGNIFICANT CHANGE UP (ref 12–78)
ANION GAP SERPL CALC-SCNC: 3 MMOL/L — LOW (ref 5–17)
ANISOCYTOSIS BLD QL: SLIGHT — SIGNIFICANT CHANGE UP
AST SERPL-CCNC: 5 U/L — LOW (ref 15–37)
BASOPHILS # BLD AUTO: 0 K/UL — SIGNIFICANT CHANGE UP (ref 0–0.2)
BASOPHILS NFR BLD AUTO: 0 % — SIGNIFICANT CHANGE UP (ref 0–2)
BILIRUB SERPL-MCNC: 0.2 MG/DL — SIGNIFICANT CHANGE UP (ref 0.2–1.2)
BUN SERPL-MCNC: 29 MG/DL — HIGH (ref 7–23)
CALCIUM SERPL-MCNC: 9.3 MG/DL — SIGNIFICANT CHANGE UP (ref 8.5–10.1)
CHLORIDE SERPL-SCNC: 96 MMOL/L — SIGNIFICANT CHANGE UP (ref 96–108)
CO2 SERPL-SCNC: 37 MMOL/L — HIGH (ref 22–31)
CREAT SERPL-MCNC: 0.76 MG/DL — SIGNIFICANT CHANGE UP (ref 0.5–1.3)
EGFR: 97 ML/MIN/1.73M2 — SIGNIFICANT CHANGE UP
EOSINOPHIL # BLD AUTO: 0 K/UL — SIGNIFICANT CHANGE UP (ref 0–0.5)
EOSINOPHIL NFR BLD AUTO: 0 % — SIGNIFICANT CHANGE UP (ref 0–6)
GLUCOSE SERPL-MCNC: 126 MG/DL — HIGH (ref 70–99)
HCT VFR BLD CALC: 38.9 % — LOW (ref 39–50)
HGB BLD-MCNC: 11.8 G/DL — LOW (ref 13–17)
LYMPHOCYTES # BLD AUTO: 0.97 K/UL — LOW (ref 1–3.3)
LYMPHOCYTES # BLD AUTO: 6 % — LOW (ref 13–44)
MAGNESIUM SERPL-MCNC: 2.7 MG/DL — HIGH (ref 1.6–2.6)
MANUAL SMEAR VERIFICATION: SIGNIFICANT CHANGE UP
MCHC RBC-ENTMCNC: 25.8 PG — LOW (ref 27–34)
MCHC RBC-ENTMCNC: 30.3 G/DL — LOW (ref 32–36)
MCV RBC AUTO: 85.1 FL — SIGNIFICANT CHANGE UP (ref 80–100)
MONOCYTES # BLD AUTO: 0.32 K/UL — SIGNIFICANT CHANGE UP (ref 0–0.9)
MONOCYTES NFR BLD AUTO: 2 % — SIGNIFICANT CHANGE UP (ref 2–14)
NEUTROPHILS # BLD AUTO: 14.84 K/UL — HIGH (ref 1.8–7.4)
NEUTROPHILS NFR BLD AUTO: 92 % — HIGH (ref 43–77)
NRBC # BLD: 0 /100 — SIGNIFICANT CHANGE UP (ref 0–0)
NRBC # BLD: SIGNIFICANT CHANGE UP /100 WBCS (ref 0–0)
OVALOCYTES BLD QL SMEAR: SLIGHT — SIGNIFICANT CHANGE UP
PHOSPHATE SERPL-MCNC: 3.6 MG/DL — SIGNIFICANT CHANGE UP (ref 2.5–4.5)
PLAT MORPH BLD: NORMAL — SIGNIFICANT CHANGE UP
PLATELET # BLD AUTO: 463 K/UL — HIGH (ref 150–400)
POIKILOCYTOSIS BLD QL AUTO: SLIGHT — SIGNIFICANT CHANGE UP
POTASSIUM SERPL-MCNC: 4.7 MMOL/L — SIGNIFICANT CHANGE UP (ref 3.5–5.3)
POTASSIUM SERPL-SCNC: 4.7 MMOL/L — SIGNIFICANT CHANGE UP (ref 3.5–5.3)
PROT SERPL-MCNC: 6.4 GM/DL — SIGNIFICANT CHANGE UP (ref 6–8.3)
RAPID RVP RESULT: SIGNIFICANT CHANGE UP
RBC # BLD: 4.57 M/UL — SIGNIFICANT CHANGE UP (ref 4.2–5.8)
RBC # FLD: 14.6 % — HIGH (ref 10.3–14.5)
RBC BLD AUTO: SIGNIFICANT CHANGE UP
SARS-COV-2 RNA SPEC QL NAA+PROBE: SIGNIFICANT CHANGE UP
SODIUM SERPL-SCNC: 136 MMOL/L — SIGNIFICANT CHANGE UP (ref 135–145)
WBC # BLD: 16.13 K/UL — HIGH (ref 3.8–10.5)
WBC # FLD AUTO: 16.13 K/UL — HIGH (ref 3.8–10.5)

## 2022-10-12 PROCEDURE — 99233 SBSQ HOSP IP/OBS HIGH 50: CPT

## 2022-10-12 PROCEDURE — 99232 SBSQ HOSP IP/OBS MODERATE 35: CPT

## 2022-10-12 RX ORDER — ALPRAZOLAM 0.25 MG
0.25 TABLET ORAL ONCE
Refills: 0 | Status: DISCONTINUED | OUTPATIENT
Start: 2022-10-12 | End: 2022-10-13

## 2022-10-12 RX ADMIN — PIPERACILLIN AND TAZOBACTAM 25 GRAM(S): 4; .5 INJECTION, POWDER, LYOPHILIZED, FOR SOLUTION INTRAVENOUS at 05:33

## 2022-10-12 RX ADMIN — Medication 650 MILLIGRAM(S): at 22:30

## 2022-10-12 RX ADMIN — SENNA PLUS 2 TABLET(S): 8.6 TABLET ORAL at 21:33

## 2022-10-12 RX ADMIN — Medication 40 MILLIGRAM(S): at 05:32

## 2022-10-12 RX ADMIN — Medication 3 MILLILITER(S): at 11:04

## 2022-10-12 RX ADMIN — Medication 650 MILLIGRAM(S): at 03:00

## 2022-10-12 RX ADMIN — Medication 40 MILLIGRAM(S): at 14:22

## 2022-10-12 RX ADMIN — Medication 30 MILLIGRAM(S): at 05:33

## 2022-10-12 RX ADMIN — Medication 4 MILLILITER(S): at 11:04

## 2022-10-12 RX ADMIN — Medication 1 TABLET(S): at 21:34

## 2022-10-12 RX ADMIN — Medication 4 MILLILITER(S): at 17:12

## 2022-10-12 RX ADMIN — Medication 1 PATCH: at 12:44

## 2022-10-12 RX ADMIN — Medication 1 PATCH: at 19:16

## 2022-10-12 RX ADMIN — Medication 1 PATCH: at 12:39

## 2022-10-12 RX ADMIN — Medication 3 MILLILITER(S): at 05:42

## 2022-10-12 RX ADMIN — Medication 650 MILLIGRAM(S): at 21:33

## 2022-10-12 RX ADMIN — POLYETHYLENE GLYCOL 3350 17 GRAM(S): 17 POWDER, FOR SOLUTION ORAL at 12:50

## 2022-10-12 RX ADMIN — Medication 650 MILLIGRAM(S): at 02:01

## 2022-10-12 RX ADMIN — TIOTROPIUM BROMIDE 1 CAPSULE(S): 18 CAPSULE ORAL; RESPIRATORY (INHALATION) at 12:47

## 2022-10-12 RX ADMIN — Medication 4 MILLILITER(S): at 23:34

## 2022-10-12 RX ADMIN — Medication 1 PATCH: at 07:53

## 2022-10-12 RX ADMIN — Medication 3 MILLILITER(S): at 23:33

## 2022-10-12 RX ADMIN — Medication 4 MILLILITER(S): at 05:43

## 2022-10-12 RX ADMIN — Medication 40 MILLIGRAM(S): at 21:40

## 2022-10-12 RX ADMIN — BUDESONIDE AND FORMOTEROL FUMARATE DIHYDRATE 2 PUFF(S): 160; 4.5 AEROSOL RESPIRATORY (INHALATION) at 18:01

## 2022-10-12 RX ADMIN — BUDESONIDE AND FORMOTEROL FUMARATE DIHYDRATE 2 PUFF(S): 160; 4.5 AEROSOL RESPIRATORY (INHALATION) at 05:33

## 2022-10-12 RX ADMIN — Medication 30 MILLILITER(S): at 21:36

## 2022-10-12 RX ADMIN — ENOXAPARIN SODIUM 40 MILLIGRAM(S): 100 INJECTION SUBCUTANEOUS at 21:34

## 2022-10-12 RX ADMIN — Medication 3 MILLIGRAM(S): at 02:02

## 2022-10-12 RX ADMIN — Medication 3 MILLILITER(S): at 17:12

## 2022-10-12 NOTE — DIETITIAN INITIAL EVALUATION ADULT - NS FNS REASON FOR WEIGHT CHANG
Pt reports UBW of 134 LBS, c wt. loss of 6 LBS recently as he walks a lot.  However, as per adm wt. of 130.2 LBS, wt. loss of ~ 4 LBS from reported UBW noted./other (specify)

## 2022-10-12 NOTE — PROGRESS NOTE ADULT - ASSESSMENT
No clinical concern that these cavities represent active disease, much less active TB.  Suspect presentation mostly COPD exacerbation  Role of Zosyn unclear.     10/12: no fever, WBC better 16.13, on steroids, BCs and UC with no growth. As per our conversation with Dr. Boyer, the pt is being followed by pulmonology as op, s/p recent biopsy, was diagnosed with organized pna. Will stop zosyn IV, the pt is treated with steroids, will start Bactrim DS 1 tab po daily.     Suggestions  stop Zosyn  start Bactrim DS 1 tab po daily  steroids per pulmonary  Supplemental O2  follow culture data  trend wbc and temperatures    discussed with Dr. Bustos  discussed with Dr. Boyer  msg sent to Dr. Crenshaw  No clinical concern that these cavities represent active disease, much less active TB.  Suspect presentation mostly COPD exacerbation  Role of Zosyn unclear.     10/12: no fever, WBC better 16.13, on steroids, BCs and UC with no growth. As per our conversation with Dr. Boyer, the pt is being followed by pulmonology as op, s/p recent biopsy, was diagnosed with cryptogenic organizing pna. Will stop zosyn IV, the pt is treated with steroids, will start Bactrim DS 1 tab po daily.     Suggestions  stop Zosyn  start Bactrim DS 1 tab po daily  steroids per pulmonary  Supplemental O2  follow culture data  trend wbc and temperatures    discussed with Dr. Bustos  discussed with Dr. Boyer  msg sent to Dr. Crenshaw

## 2022-10-12 NOTE — PROGRESS NOTE ADULT - ASSESSMENT
*family provided more hx today  *spoke to pt's pulmonologist's office (Dr. Alatorre)- records were faxed  *review of outpatient records show pt with diagnosis of  HTN, dysphagia, esophageal dysmotility, GERD, hiatal hernia, schatzki's ring of distal esophagus, s/p esophagea dilatation 10/2021,childhood head injury with resultant speech impairment, arthritis, L eye blindness, varicose veins, bronchoscopy 12/2021, cryptogenic organizing PNA, severe COPD centrilobular emphysema), asthma, active smoker, abnormal CT chest with bilateral pulmonary nodules/mass -> found to be focal organizing pneumonia with increasing/decreasing nodules/masses that is steroid responsive  *recently seen in office 10/6 and was started on prednisone 40mg daily with bactrim PCP prophylaxis along with azithromycin. he is maintained on trelegy ellipta, singulair, albuterol HFA q6 as needed        71M active smoker recently undomiciled with PMH COPD not on home O2, never intubated, asthma, , B/L hip replacement, blind L eye, Deaf L ear, hiatal hernia, GERD, schatzki's ring  presents for SOBx4 days. Found to be hypoxic. Admitted for COPD exacerbation, acute hypoxic respiratory failure, CT chest with L upper and lower lobe small cavitary lesion with adjacent 6mm nodule    - active smoker, nicotine patch  - d/w need for smoking cessation  - no fever, no chills, no sweats, no weight loss  - no known exposure with someone with TB, no sick contacts  - only recently for 3 days stayed at shelter, without constitutional symptoms, low suspicion for TB  - reports he has had 3 prior episodes of PNA and multiple COPD exacerbations for which he would get antibx and inhalers with steroids  - charts reviewed and pt carries a diagnosis of : this is not an infectious pneumonia, it's an underlying inflammatory disease of lung  - he was started on steroid therapy with daily prednisone  - would recommend continuing prednisone 40mg daily (will need a slow taper over the course of weeks/months), no need for IV solumedrol  - due to extended course of steroid therapy would place on PCP prophylaxis (bactrim DS three times a week)  - CT chest reviewed: small cystic cavities without surrounding opacities. noted is a small nodule adjacent to cavity. cavities do not appear to be fungal  - d/w chest radiologist   - suspect findings more consistent with sequale of prior infections taking into account underlying history  - cont duonebs ATC  - on antibx which was broadened from rocephin to zosyn by primary team, however would opt for d/c antibx or at least a limited course (no suspicion for active bacterial infection)  - pt was placed on chest vest in the past (but he states he does not use it) and reportedly was also prescribed oxygen therapy in the past for which he does not use either  - can use aerobika assist cough device with nebulizers to help mobilize secretions  - upon d/c he should resume his trelegy  - pt also reveals that his gastroenterologist stated to him he needs "baby food"- may benefit from putting pt on purree diet with esophageal motility issues  - will follow  - outpatient records were placed in chart this afternoon  - d/w hospitalist, ID, patient

## 2022-10-12 NOTE — DIETITIAN INITIAL EVALUATION ADULT - ETIOLOGY
inadequate/increased protein-energy intake in setting of COPD, altered GI function c hx of hiatal hernia/GERD, social hx of living in shelter

## 2022-10-12 NOTE — DIETITIAN INITIAL EVALUATION ADULT - PERTINENT MEDS FT
MEDICATIONS  (STANDING):  acetylcysteine 10%  Inhalation 4 milliLiter(s) Inhalation every 6 hours  albuterol/ipratropium for Nebulization 3 milliLiter(s) Nebulizer every 6 hours  budesonide 160 MICROgram(s)/formoterol 4.5 MICROgram(s) Inhaler 2 Puff(s) Inhalation two times a day  enoxaparin Injectable 40 milliGRAM(s) SubCutaneous every 12 hours  methylPREDNISolone sodium succinate Injectable 40 milliGRAM(s) IV Push every 8 hours  nicotine - 21 mG/24Hr(s) Patch 1 Patch Transdermal daily  NIFEdipine XL 30 milliGRAM(s) Oral daily  piperacillin/tazobactam IVPB.. 3.375 Gram(s) IV Intermittent every 8 hours  polyethylene glycol 3350 17 Gram(s) Oral daily  senna 2 Tablet(s) Oral at bedtime  tiotropium 18 MICROgram(s) Capsule 1 Capsule(s) Inhalation daily    MEDICATIONS  (PRN):  acetaminophen     Tablet .. 650 milliGRAM(s) Oral every 6 hours PRN Temp greater or equal to 38C (100.4F), Mild Pain (1 - 3)  aluminum hydroxide/magnesium hydroxide/simethicone Suspension 30 milliLiter(s) Oral every 4 hours PRN Dyspepsia  bisacodyl 5 milliGRAM(s) Oral every 12 hours PRN Constipation  melatonin 3 milliGRAM(s) Oral at bedtime PRN Insomnia  ondansetron Injectable 4 milliGRAM(s) IV Push every 8 hours PRN Nausea and/or Vomiting

## 2022-10-12 NOTE — DIETITIAN INITIAL EVALUATION ADULT - OTHER INFO
Pt is an active smoker, PMH include hiatal hernia, GERD.  Pt educated on adequate protein-energy intake c use of supplements as he is able to tolerate liquids at present and to avoid gastric irritants.

## 2022-10-12 NOTE — DIETITIAN INITIAL EVALUATION ADULT - ORAL INTAKE PTA/DIET HISTORY
Pt reports hx of difficulty keeping food down, is able to keep fluids down.  Pt reports no problems c swallowing, however brings up food after he eats & had procedures done to open up the esophagus and is due to follow up c MD for this problem.   Pt c/o heartburn at present, Nurse made aware.  Pt lived in [facility], food was provided in the Shelter.  Pt without bottom teeth, ate food cut up in small pieces PTA.

## 2022-10-12 NOTE — DIETITIAN INITIAL EVALUATION ADULT - OTHER CALCULATIONS
Height (cm): 160 (10-12)  Weight (kg): 59.1 (10-12)  BMI (kg/m2): 23.1 (10-12)  IBW: 56. 2 kg     % IBW: 105%        UBW: 60.9 kg          %UBW: 97%, 10/10, 59.9 kg -> 10/12, 131.2 kg is an error, Nursing made aware

## 2022-10-12 NOTE — PROGRESS NOTE ADULT - ASSESSMENT
This is a 72 y/o M active smoker with PMHx of asthma, COPD, emphysema, presents due to worsening sob x4 days. At baseline patient does not require O2 and has never been intubated. was recently prescribed penicillin for presumed PNA by pcp. denies sick contacts, f/c, h/a, cough, cp, palpitations, abd pain, n/v/d/c, syncope. no recent steroid use. presenting vitals 162/99 hypoxic on RA placed on 12 L NRB. tachy 146 bpm and tachypneic 26. wbc 21.6, abg compensated respiratory acidosis co2 59. Given azithro, rocephin, nebs and medrol in ED    acute hypoxic hypercapneic respiratory failure  due to 1. acute copd exacerbation 2. CAP 3 other ddx (infectious malignancy)  sepsis POA  -nebs, symbicort, medrol  -CTA noted   plan:  -nebs, symbicort, medrol  =Pulm and ID conslted  -change rocephin to zosyn  -supplemental O2 for sat goal> 92%     htn   -start nifedipine 30 d     -regular diet, scds

## 2022-10-12 NOTE — DIETITIAN NUTRITION RISK NOTIFICATION - TREATMENT: THE FOLLOWING DIET HAS BEEN RECOMMENDED
Diet, Soft and Bite Sized:   No Caffeine  No Citrus  No Tomatoes  Supplement Feeding Modality:  Oral  Ensure Enlive Cans or Servings Per Day:  1       Frequency:  Two Times a day (10-12-22 @ 11:28) [Pending Verification By Attending]  Diet, Soft and Bite Sized (10-11-22 @ 19:17) [Active]

## 2022-10-12 NOTE — DIETITIAN INITIAL EVALUATION ADULT - PERTINENT LABORATORY DATA
10-12    136  |  96  |  29<H>  ----------------------------<  126<H>/steroid med noted   4.7   |  37<H>  |  0.76    Ca    9.3      12 Oct 2022 08:05  Phos  3.6     10-12  Mg     2.7     10-12    TPro  6.4  /  Alb  2.7<L>  /  TBili  0.2  /  DBili  x   /  AST  5<L>  /  ALT  15  /  AlkPhos  76  10-12

## 2022-10-12 NOTE — DIETITIAN INITIAL EVALUATION ADULT - REASON INDICATOR FOR ASSESSMENT
BMI>40, wt.130.2 kg is in error, pt c wt. of 130.2 LBS on adm.  vital flow sheet can only be recorded in kg, not in pounds, Nursing made aware.  BMI>40 as per height/wt. of 130.2 kg which is in error->pt c wt. of 130.2 LBS.  vital flow sheet can only be recorded in kg, not in pounds, Nursing made aware.   Pt seen due to visible findings of malnutrition.  BMI>40 as per wt. of 130.2 kg which is in error->pt c wt. of 130.2 LBS.  vital flow sheet can only be recorded in kg, not in pounds, Nursing made aware.   Pt c BMI=23.1 as per corrected height and wt.  Pt seen due to visible findings of malnutrition.  BMI>40 as per wt. of 130.2 kg which is in error->pt c wt. of 130.2 LBS.  vital flow sheet can only be recorded in kg, not in pounds, Nursing made aware.   Pt c BMI=23.0 as per corrected height and wt.  Pt seen due to visible findings of malnutrition.

## 2022-10-13 VITALS
RESPIRATION RATE: 18 BRPM | TEMPERATURE: 98 F | OXYGEN SATURATION: 95 % | SYSTOLIC BLOOD PRESSURE: 126 MMHG | DIASTOLIC BLOOD PRESSURE: 87 MMHG | HEART RATE: 99 BPM

## 2022-10-13 PROCEDURE — 99239 HOSP IP/OBS DSCHRG MGMT >30: CPT

## 2022-10-13 PROCEDURE — 93306 TTE W/DOPPLER COMPLETE: CPT | Mod: 26

## 2022-10-13 PROCEDURE — 99232 SBSQ HOSP IP/OBS MODERATE 35: CPT

## 2022-10-13 PROCEDURE — 99233 SBSQ HOSP IP/OBS HIGH 50: CPT

## 2022-10-13 RX ORDER — ALBUTEROL 90 UG/1
2 AEROSOL, METERED ORAL
Qty: 1 | Refills: 0
Start: 2022-10-13

## 2022-10-13 RX ORDER — NICOTINE POLACRILEX 2 MG
1 GUM BUCCAL
Qty: 15 | Refills: 0
Start: 2022-10-13 | End: 2022-10-27

## 2022-10-13 RX ADMIN — Medication 40 MILLIGRAM(S): at 05:22

## 2022-10-13 RX ADMIN — Medication 650 MILLIGRAM(S): at 05:19

## 2022-10-13 RX ADMIN — Medication 4 MILLILITER(S): at 05:27

## 2022-10-13 RX ADMIN — Medication 4 MILLILITER(S): at 17:06

## 2022-10-13 RX ADMIN — Medication 3 MILLILITER(S): at 05:26

## 2022-10-13 RX ADMIN — Medication 0.25 MILLIGRAM(S): at 13:03

## 2022-10-13 RX ADMIN — Medication 650 MILLIGRAM(S): at 06:51

## 2022-10-13 RX ADMIN — Medication 1 PATCH: at 12:59

## 2022-10-13 RX ADMIN — Medication 3 MILLILITER(S): at 17:05

## 2022-10-13 RX ADMIN — Medication 30 MILLIGRAM(S): at 05:20

## 2022-10-13 RX ADMIN — Medication 3 MILLILITER(S): at 11:07

## 2022-10-13 RX ADMIN — Medication 4 MILLILITER(S): at 11:07

## 2022-10-13 RX ADMIN — Medication 1 PATCH: at 12:48

## 2022-10-13 RX ADMIN — Medication 1 PATCH: at 07:47

## 2022-10-13 RX ADMIN — BUDESONIDE AND FORMOTEROL FUMARATE DIHYDRATE 2 PUFF(S): 160; 4.5 AEROSOL RESPIRATORY (INHALATION) at 05:22

## 2022-10-13 NOTE — PROGRESS NOTE ADULT - SUBJECTIVE AND OBJECTIVE BOX
24 hr events:  no events overnight  pt states he [facility] to [facility] back to the shelter today and is not staying in the hospital any longer  reports his SOB is better  denies wheezing  +chronic cough  no fevers or chills    ## ROS:  no fever, no chills  no HA, no dizziness  no sore throat, no rhinorrhea  denies SOB/WATERMAN, + chronic cough, no wheezing  no CP, no palpitations  no abdominal pain, no N/V/D  no dysuria, frequency  no leg swelling  no arthralgias or myalgias  no heat/cold intolerance     ## Labs:  CBC:                        11.8   16.13 )-----------( 463      ( 12 Oct 2022 08:05 )             38.9     Chem:  10-12    136  |  96  |  29<H>  ----------------------------<  126<H>  4.7   |  37<H>  |  0.76    Ca    9.3      12 Oct 2022 08:05  Phos  3.6     10-12  Mg     2.7     10-12    TPro  6.4  /  Alb  2.7<L>  /  TBili  0.2  /  DBili  x   /  AST  5<L>  /  ALT  15  /  AlkPhos  76  10-12    ## Medications:  trimethoprim  160 mG/sulfamethoxazole 800 mG 1 Tablet(s) Oral daily    NIFEdipine XL 30 milliGRAM(s) Oral daily    acetylcysteine 10%  Inhalation 4 milliLiter(s) Inhalation every 6 hours  albuterol/ipratropium for Nebulization 3 milliLiter(s) Nebulizer every 6 hours  budesonide 160 MICROgram(s)/formoterol 4.5 MICROgram(s) Inhaler 2 Puff(s) Inhalation two times a day  tiotropium 18 MICROgram(s) Capsule 1 Capsule(s) Inhalation daily    predniSONE   Tablet 40 milliGRAM(s) Oral daily    enoxaparin Injectable 40 milliGRAM(s) SubCutaneous every 12 hours    aluminum hydroxide/magnesium hydroxide/simethicone Suspension 30 milliLiter(s) Oral every 4 hours PRN  bisacodyl 5 milliGRAM(s) Oral every 12 hours PRN  polyethylene glycol 3350 17 Gram(s) Oral daily  senna 2 Tablet(s) Oral at bedtime    acetaminophen     Tablet .. 650 milliGRAM(s) Oral every 6 hours PRN  melatonin 3 milliGRAM(s) Oral at bedtime PRN  ondansetron Injectable 4 milliGRAM(s) IV Push every 8 hours PRN      ## Vitals:  T(C): 36.4 (10-13-22 @ 17:12), Max: 36.8 (10-13-22 @ 05:01)  HR: 99 (10-13-22 @ 17:12) (81 - 99)  BP: 126/87 (10-13-22 @ 17:12) (126/79 - 132/80)  BP(mean): 95 (10-13-22 @ 05:01) (95 - 97)  RR: 18 (10-13-22 @ 17:12) (18 - 19)  SpO2: 95% (10-13-22 @ 17:12) (94% - 96%)        ## P/E:  Gen: sitting comfortably in bed , no respiratory distress, irritable- wants to go home  HEENT: sclera white, dentures in place  Resp: no wheeze, no rales, minor rhonchi at bases bilaterally  CVS: RRR  Abd: soft NT/ND +BS  Ext: no c/c/e  Neuro: A&Ox3, mild speech impediment, moving all extremities, follows commands    
HAILE GARVEY  MRN-[id number]    Follow Up:  organized pna    Interval History: the pt was seen and examined earlier, eager to go home, somewhat aggressive in his talk and behavior. The pt is afebrile, on RA, no new lab work.     PAST MEDICAL & SURGICAL HISTORY:  S/P hip replacement, bilateral          ROS:    [ ] Unobtainable because:  [ x] All other systems negative    Constitutional: no fever, no chills  Head: no trauma  Eyes: no vision changes, no eye pain  ENT:  no sore throat, no rhinorrhea  Cardiovascular:  no chest pain, no palpitation  Respiratory:  no SOB, no cough  GI:  no abd pain, no vomiting, no diarrhea  urinary: no dysuria, no hematuria, no flank pain  musculoskeletal:  no joint pain, no joint swelling  skin:  no rash  neurology:  no headache, no seizure, no change in mental status  psych: no anxiety, no depression         Allergies  No Known Allergies        ANTIMICROBIALS:  trimethoprim  160 mG/sulfamethoxazole 800 mG 1 daily      OTHER MEDS:  acetaminophen     Tablet .. 650 milliGRAM(s) Oral every 6 hours PRN  acetylcysteine 10%  Inhalation 4 milliLiter(s) Inhalation every 6 hours  albuterol/ipratropium for Nebulization 3 milliLiter(s) Nebulizer every 6 hours  aluminum hydroxide/magnesium hydroxide/simethicone Suspension 30 milliLiter(s) Oral every 4 hours PRN  bisacodyl 5 milliGRAM(s) Oral every 12 hours PRN  budesonide 160 MICROgram(s)/formoterol 4.5 MICROgram(s) Inhaler 2 Puff(s) Inhalation two times a day  enoxaparin Injectable 40 milliGRAM(s) SubCutaneous every 12 hours  melatonin 3 milliGRAM(s) Oral at bedtime PRN  nicotine - 21 mG/24Hr(s) Patch 1 Patch Transdermal daily  NIFEdipine XL 30 milliGRAM(s) Oral daily  ondansetron Injectable 4 milliGRAM(s) IV Push every 8 hours PRN  polyethylene glycol 3350 17 Gram(s) Oral daily  predniSONE   Tablet 40 milliGRAM(s) Oral daily  senna 2 Tablet(s) Oral at bedtime  tiotropium 18 MICROgram(s) Capsule 1 Capsule(s) Inhalation daily      Vital Signs Last 24 Hrs  T(C): 36.4 (13 Oct 2022 17:12), Max: 36.8 (13 Oct 2022 05:01)  T(F): 97.5 (13 Oct 2022 17:12), Max: 98.2 (13 Oct 2022 05:01)  HR: 99 (13 Oct 2022 17:12) (81 - 119)  BP: 126/87 (13 Oct 2022 17:12) (126/79 - 132/80)  BP(mean): 95 (13 Oct 2022 05:01) (95 - 97)  RR: 18 (13 Oct 2022 17:12) (18 - 19)  SpO2: 95% (13 Oct 2022 17:12) (94% - 97%)    Parameters below as of 13 Oct 2022 17:12  Patient On (Oxygen Delivery Method): room air        Physical Exam:  General: Nontoxic-appearing Male in no acute distress. Thin male, RA   HEENT: AT/NC. Anicteric. Conjunctiva pink and moist. Oropharynx clear. Dentition absent- dentures.   Neck: Not rigid. No sense of mass.  Nodes: None palpable.  Lungs: Diminished BS Bilaterally, no RWR  Heart: Tachycardic, no audible murmur   Abdomen: Bowel sounds present and normoactive. Soft. Nondistended. Nontender. No sense of mass. No organomegaly.  Back: No spinal tenderness. No costovertebral angle tenderness.   Extremities: No cyanosis or clubbing. No edema.   Skin: Warm. Dry. Good turgor. No rash. No vasculitic stigmata.  Psychiatric: somewhat aggressive, eager to go home today    WBC Count: 16.13 K/uL (10-12 @ 08:05)  WBC Count: 16.60 K/uL (10-11 @ 05:55)  WBC Count: 14.30 K/uL (10-09 @ 07:05)  WBC Count: 21.62 K/uL (10-08 @ 19:33)                            11.8   16.13 )-----------( 463      ( 12 Oct 2022 08:05 )             38.9       10-12    136  |  96  |  29<H>  ----------------------------<  126<H>  4.7   |  37<H>  |  0.76    Ca    9.3      12 Oct 2022 08:05  Phos  3.6     10-12  Mg     2.7     10-12    TPro  6.4  /  Alb  2.7<L>  /  TBili  0.2  /  DBili  x   /  AST  5<L>  /  ALT  15  /  AlkPhos  76  10-12          Creatinine Trend: 0.76<--, 0.83<--, 0.80<--, 1.02<--      MICROBIOLOGY:  v  Clean Catch Clean Catch (Midstream)  10-09-22   <10,000 CFU/mL Normal Urogenital Val  --  --      .Blood Blood-Peripheral  10-08-22   No growth to date.  --  --      .Blood Blood-Peripheral  10-08-22   No growth to date.  --  --          Rapid RVP Result: NotDetec (10-12 @ 08:15)              D-Dimer Assay, Quantitative: 261 (10-08)      SARS-CoV-2: NotDetec (10-12-22 @ 08:15)  Rapid RVP Result: NotDetec (10-12-22 @ 08:15)  SARS-CoV-2 Result: NotDetec (10-08-22 @ 19:33)    SARS-CoV-2: NotDetec (12 Oct 2022 08:15)    RADIOLOGY:    
24 hr events  reports feeling better  still gets "food stuck in throat"  appears more comfortable with breathing   less dyspneic  had episode of respiratory distress yesterday night after eating dinner: tachycardic HR 130s, tachypenic RR 40, O2 desat mid 80s, diffuse wheeze -> improved with stat duonebs    ROS  no fever, no chills, no sweats  no changes in vision or hearing  no sore throat, no rhinorrhea  + cough +SOB (improved)  no CP, no palpitations  + dysphagia  no abdominal pain/N/V/D  no dysuria  no joint pain or muscle aches  no swelling of extremities  no back pain    MEDICATIONS  (STANDING):  acetylcysteine 10%  Inhalation 4 milliLiter(s) Inhalation every 6 hours  albuterol/ipratropium for Nebulization 3 milliLiter(s) Nebulizer every 6 hours  ALPRAZolam 0.25 milliGRAM(s) Oral once  budesonide 160 MICROgram(s)/formoterol 4.5 MICROgram(s) Inhaler 2 Puff(s) Inhalation two times a day  enoxaparin Injectable 40 milliGRAM(s) SubCutaneous every 12 hours  methylPREDNISolone sodium succinate Injectable 40 milliGRAM(s) IV Push every 8 hours  nicotine - 21 mG/24Hr(s) Patch 1 Patch Transdermal daily  NIFEdipine XL 30 milliGRAM(s) Oral daily  polyethylene glycol 3350 17 Gram(s) Oral daily  senna 2 Tablet(s) Oral at bedtime  tiotropium 18 MICROgram(s) Capsule 1 Capsule(s) Inhalation daily      MEDICATIONS  (PRN):  acetaminophen     Tablet .. 650 milliGRAM(s) Oral every 6 hours PRN Temp greater or equal to 38C (100.4F), Mild Pain (1 - 3)  aluminum hydroxide/magnesium hydroxide/simethicone Suspension 30 milliLiter(s) Oral every 4 hours PRN Dyspepsia  bisacodyl 5 milliGRAM(s) Oral every 12 hours PRN Constipation  melatonin 3 milliGRAM(s) Oral at bedtime PRN Insomnia  ondansetron Injectable 4 milliGRAM(s) IV Push every 8 hours PRN Nausea and/or Vomiting    ##LABS              11.8   16.13 )-----------( 463      ( 12 Oct 2022 08:05 )             38.9       10-12    136  |  96  |  29<H>  ----------------------------<  126<H>  4.7   |  37<H>  |  0.76    Ca    9.3      12 Oct 2022 08:05  Phos  3.6     10-12  Mg     2.7     10-12    TPro  6.4  /  Alb  2.7<L>  /  TBili  0.2  /  DBili  x   /  AST  5<L>  /  ALT  15  /  AlkPhos  76  10-12    Culture - Blood (10.08.22 @ 19:30)    Specimen Source: .Blood Blood-Peripheral    Culture Results: No growth to date.    Culture - Urine (10.09.22 @ 09:34)    Specimen Source: Clean Catch Clean Catch (Midstream)    Culture Results: <10,000 CFU/mL Normal Urogenital Val    Respiratory Viral Panel with COVID-19 by RAMANDEEP (10.12.22 @ 08:15)    Rapid RVP Result: NotDetec    SARS-CoV-2: NotDetec    Flu With COVID-19 By RAMANDEEP (10.08.22 @ 19:33)    SARS-CoV-2 Result: NotDetec: EUA/IVD    Influenza A Result: NotDetec: EUA/IVD    Influenza B Result: NotDetec: EUA/IVD    ##IMAGING  CXR < from: Xray Chest 1 View- PORTABLE-Urgent (Xray Chest 1 View- PORTABLE-Urgent .) (10.08.22 @ 21:29) >  No active pulmonary disease.    CT chest < from: CT Angio Chest PE Protocol w/ IV Cont (10.11.22 @ 07:03) >  No acute pulmonary embolism.    Peribronchial thickening in the lower lobes more than upper lobe and   numerous thick-walled cavitary lesions in the left upper and lower lobe   up to 1.1 cm concerning for sequela from infectious disease.Centrilobular   emphysema.    Moderate sized hiatal hernia without obstruction.    Uncomplicated sigmoid colonic diverticulosis.    ##VITALS  T(C): 36.6 (12 Oct 2022 11:04), Max: 36.9 (12 Oct 2022 00:26)  T(F): 97.8 (12 Oct 2022 11:04), Max: 98.5 (12 Oct 2022 00:26)  HR: 100 (12 Oct 2022 11:21) (90 - 114)  BP: 146/81 (12 Oct 2022 11:04) (106/71 - 146/81)  BP(mean): 83 (12 Oct 2022 04:40) (83 - 100)  RR: 18 (12 Oct 2022 11:04) (18 - 19)  SpO2: 96% (12 Oct 2022 11:21) (92% - 97%)    Parameters below as of 12 Oct 2022 11:21  Patient On (Oxygen Delivery Method): nasal cannula    ##EXAM  GEN: NAD, comfortable in bed, laying on side  HEENT: sclera white, dentures, moist oral mucosa  CV: RRR  PULM: no wheeze, intermittent rhonchi bilaterally, no rales  ABD: soft, ND, NT, +BS  EXT: no edema, no cyanosis  NEURO: awake, alert, mild speech impediment          
HAILE GARVEY  [id number]    Follow Up:  leukocytosis, possible aspiration, organized pna    Interval History: the pt was seen and examined earlier, no distress, answers questions appropriately, on RA with O2 SAt 88%, states that he had O2 at home but has not been using it, no fevers, WBC better 16.13.     PAST MEDICAL & SURGICAL HISTORY:  S/P hip replacement, bilateral          ROS:    [ ] Unobtainable because:  [ x] All other systems negative    Constitutional: no fever, no chills  Head: no trauma  Eyes: no vision changes, no eye pain  ENT:  no sore throat, no rhinorrhea  Cardiovascular:  no chest pain, no palpitation  Respiratory:  no SOB at the moment , no cough  GI:  no abd pain, no vomiting, no diarrhea  urinary: no dysuria, no hematuria, no flank pain  musculoskeletal:  no joint pain, no joint swelling  skin:  no rash  neurology:  no headache, no seizure, no change in mental status  psych: no anxiety, no depression         Allergies  No Known Allergies        ANTIMICROBIALS:  piperacillin/tazobactam IVPB.. 3.375 every 8 hours      OTHER MEDS:  acetaminophen     Tablet .. 650 milliGRAM(s) Oral every 6 hours PRN  acetylcysteine 10%  Inhalation 4 milliLiter(s) Inhalation every 6 hours  albuterol/ipratropium for Nebulization 3 milliLiter(s) Nebulizer every 6 hours  aluminum hydroxide/magnesium hydroxide/simethicone Suspension 30 milliLiter(s) Oral every 4 hours PRN  bisacodyl 5 milliGRAM(s) Oral every 12 hours PRN  budesonide 160 MICROgram(s)/formoterol 4.5 MICROgram(s) Inhaler 2 Puff(s) Inhalation two times a day  enoxaparin Injectable 40 milliGRAM(s) SubCutaneous every 12 hours  melatonin 3 milliGRAM(s) Oral at bedtime PRN  methylPREDNISolone sodium succinate Injectable 40 milliGRAM(s) IV Push every 8 hours  nicotine - 21 mG/24Hr(s) Patch 1 Patch Transdermal daily  NIFEdipine XL 30 milliGRAM(s) Oral daily  ondansetron Injectable 4 milliGRAM(s) IV Push every 8 hours PRN  polyethylene glycol 3350 17 Gram(s) Oral daily  senna 2 Tablet(s) Oral at bedtime  tiotropium 18 MICROgram(s) Capsule 1 Capsule(s) Inhalation daily      Vital Signs Last 24 Hrs  T(C): 36.6 (12 Oct 2022 11:04), Max: 36.9 (12 Oct 2022 00:26)  T(F): 97.8 (12 Oct 2022 11:04), Max: 98.5 (12 Oct 2022 00:26)  HR: 100 (12 Oct 2022 11:21) (90 - 114)  BP: 146/81 (12 Oct 2022 11:04) (106/71 - 146/81)  BP(mean): 83 (12 Oct 2022 04:40) (83 - 100)  RR: 18 (12 Oct 2022 11:04) (18 - 19)  SpO2: 96% (12 Oct 2022 11:21) (92% - 97%)    Parameters below as of 12 Oct 2022 11:21  Patient On (Oxygen Delivery Method): nasal cannula        Physical Exam:  General: Nontoxic-appearing Male in no acute distress. Thin male, RA 88% O2 SAT  HEENT: AT/NC. Anicteric. Conjunctiva pink and moist. Oropharynx clear. Dentition absent- dentures.   Neck: Not rigid. No sense of mass.  Nodes: None palpable.  Lungs: Diminished BS Bilaterally, no RWR  Heart: Tachycardic, no audible murmur   Abdomen: Bowel sounds present and normoactive. Soft. Nondistended. Nontender. No sense of mass. No organomegaly.  Back: No spinal tenderness. No costovertebral angle tenderness.   Extremities: No cyanosis or clubbing. No edema.   Skin: Warm. Dry. Good turgor. No rash. No vasculitic stigmata.  Psychiatric: Appropriate affect and mood for situation.     WBC Count: 16.13 K/uL (10-12 @ 08:05)  WBC Count: 16.60 K/uL (10-11 @ 05:55)  WBC Count: 14.30 K/uL (10-09 @ 07:05)  WBC Count: 21.62 K/uL (10-08 @ 19:33)                            11.8   16.13 )-----------( 463      ( 12 Oct 2022 08:05 )             38.9       10-12    136  |  96  |  29<H>  ----------------------------<  126<H>  4.7   |  37<H>  |  0.76    Ca    9.3      12 Oct 2022 08:05  Phos  3.6     10-12  Mg     2.7     10-12    TPro  6.4  /  Alb  2.7<L>  /  TBili  0.2  /  DBili  x   /  AST  5<L>  /  ALT  15  /  AlkPhos  76  10-12          Creatinine Trend: 0.76<--, 0.83<--, 0.80<--, 1.02<--      MICROBIOLOGY:  v  Clean Catch Clean Catch (Midstream)  10-09-22   <10,000 CFU/mL Normal Urogenital Val  --  --      .Blood Blood-Peripheral  10-08-22   No growth to date.  --  --      .Blood Blood-Peripheral  10-08-22   No growth to date.  --  --          Rapid RVP Result: NotDetec (10-12 @ 08:15)              D-Dimer Assay, Quantitative: 261 (10-08)      SARS-CoV-2: NotDetec (10-12-22 @ 08:15)  Rapid RVP Result: NotDetec (10-12-22 @ 08:15)  SARS-CoV-2 Result: NotDetec (10-08-22 @ 19:33)    SARS-CoV-2: NotDetec (12 Oct 2022 08:15)    RADIOLOGY:    
Patient seen and examined  room air sat is 86 percent  patient very short of breath and tachy when ambulating      Review of Systems:  General:denies fever chills, headache, weakness  HEENT: denies blurry vision,diffculty swallowing, difficulty hearing, tinnitus  Cardiovascular: denies chest pain  ,palpitations  Pulmonary:denies shortness of breath, cough, wheezing, hemoptysis  Gastrointestinal: denies abdominal pain, constipation, diarrhea,nausea , vomiting, hematochezia  : denies hematuria, dysuria, or incontinence  Neurological: denies weakness, numbness , tingling, dizziness, tremors  MSK: denies muscle pain, difficulty ambulating, swelling, back pain  skin: denies skin rash, itching, burning, or  skin lesions  Psychiatrical: denies mood disturbances, anxierty, feeling depressed, depression , or difficulty sleeping    Objective:  Vitals  T(C): 36.8 (10-10-22 @ 11:53), Max: 36.8 (10-10-22 @ 11:53)  HR: 108 (10-10-22 @ 11:53) (72 - 108)  BP: 135/80 (10-10-22 @ 11:53) (103/69 - 135/80)  RR: 18 (10-10-22 @ 11:53) (18 - 18)  SpO2: 91% (10-10-22 @ 11:53) (89% - 92%)    Physical Exam:  General: comfortable, no acute distress, well nourished  HEENT: Atraumatic, no LAD, trachea midline, PERRLA  Cardiovascular: normal s1s2, no murmurs, gallops or fricition rubs  Pulmonary: clear to ausculation Bilaterally, no wheezing , rhonchi  Gastrointestinal: soft non tender non distended, no masses felt, no organomegally  Muscloskeletal: no lower extremity edema, intact bilateral lower extremity pulses  Neurological: CN II-12 intact. No focal weakness  Psychiatrical: normal mood, cooperative  SKIN: no rash, lesions or ulcers    Labs:                          11.4   14.30 )-----------( 416      ( 09 Oct 2022 07:05 )             37.3     10-09    135  |  97  |  30<H>  ----------------------------<  191<H>  4.7   |  30  |  0.80    Ca    9.3      09 Oct 2022 07:05    TPro  8.0  /  Alb  3.3  /  TBili  0.5  /  DBili  x   /  AST  11<L>  /  ALT  18  /  AlkPhos  88  10-08    LIVER FUNCTIONS - ( 08 Oct 2022 19:33 )  Alb: 3.3 g/dL / Pro: 8.0 gm/dL / ALK PHOS: 88 U/L / ALT: 18 U/L / AST: 11 U/L / GGT: x           PT/INR - ( 08 Oct 2022 19:33 )   PT: 12.9 sec;   INR: 1.08 ratio         PTT - ( 08 Oct 2022 19:33 )  PTT:27.9 sec      Active Medications  MEDICATIONS  (STANDING):  budesonide 160 MICROgram(s)/formoterol 4.5 MICROgram(s) Inhaler 2 Puff(s) Inhalation two times a day  methylPREDNISolone sodium succinate Injectable 40 milliGRAM(s) IV Push every 8 hours  nicotine - 21 mG/24Hr(s) Patch 1 Patch Transdermal daily  NIFEdipine XL 30 milliGRAM(s) Oral daily  polyethylene glycol 3350 17 Gram(s) Oral daily  senna 2 Tablet(s) Oral at bedtime  tiotropium 18 MICROgram(s) Capsule 1 Capsule(s) Inhalation daily    MEDICATIONS  (PRN):  acetaminophen     Tablet .. 650 milliGRAM(s) Oral every 6 hours PRN Temp greater or equal to 38C (100.4F), Mild Pain (1 - 3)  aluminum hydroxide/magnesium hydroxide/simethicone Suspension 30 milliLiter(s) Oral every 4 hours PRN Dyspepsia  bisacodyl 5 milliGRAM(s) Oral every 12 hours PRN Constipation  melatonin 3 milliGRAM(s) Oral at bedtime PRN Insomnia  ondansetron Injectable 4 milliGRAM(s) IV Push every 8 hours PRN Nausea and/or Vomiting    
Patient seen and examined  wants to be discharged  on two liters++wheezing tachy on ambulation, a  CT noted: x cavitary lesions    Review of Systems:  General:denies fever chills, headache, weakness  HEENT: denies blurry vision,diffculty swallowing, difficulty hearing, tinnitus  Cardiovascular: denies chest pain  ,palpitations  Pulmonary:denies shortness of breath, cough, wheezing, hemoptysis  Gastrointestinal: denies abdominal pain, constipation, diarrhea,nausea , vomiting, hematochezia  : denies hematuria, dysuria, or incontinence  Neurological: denies weakness, numbness , tingling, dizziness, tremors  MSK: denies muscle pain, difficulty ambulating, swelling, back pain  skin: denies skin rash, itching, burning, or  skin lesions  Psychiatrical: denies mood disturbances, anxierty, feeling depressed, depression , or difficulty sleeping    Objective:  Vitals  T(C): 36.4 (10-11-22 @ 04:48), Max: 37.1 (10-10-22 @ 17:22)  HR: 62 (10-11-22 @ 07:03) (62 - 124)  BP: 125/79 (10-11-22 @ 04:48) (125/79 - 143/80)  RR: 18 (10-11-22 @ 04:48) (16 - 18)  SpO2: 97% (10-11-22 @ 06:38) (90% - 97%)    Physical Exam:  General: comfortable, no acute distress, well nourished  HEENT: Atraumatic, no LAD, trachea midline, PERRLA  Cardiovascular: normal s1s2, no murmurs, gallops or fricition rubs  Pulmonary: + wheezing , rhonchi  Gastrointestinal: soft non tender non distended, no masses felt, no organomegally  Muscloskeletal: no lower extremity edema, intact bilateral lower extremity pulses  Neurological: CN II-12 intact. No focal weakness  Psychiatrical: normal mood, cooperative  SKIN: no rash, lesions or ulcers    Labs:                          12.5   16.60 )-----------( 522      ( 11 Oct 2022 05:55 )             39.7     10-11    137  |  95<L>  |  37<H>  ----------------------------<  116<H>  5.0   |  37<H>  |  0.83    Ca    9.4      11 Oct 2022 05:55  Mg     2.8     10-11              Active Medications  MEDICATIONS  (STANDING):  acetylcysteine 10%  Inhalation 4 milliLiter(s) Inhalation every 6 hours  albuterol/ipratropium for Nebulization 3 milliLiter(s) Nebulizer every 6 hours  budesonide 160 MICROgram(s)/formoterol 4.5 MICROgram(s) Inhaler 2 Puff(s) Inhalation two times a day  enoxaparin Injectable 40 milliGRAM(s) SubCutaneous every 24 hours  methylPREDNISolone sodium succinate Injectable 40 milliGRAM(s) IV Push every 8 hours  nicotine - 21 mG/24Hr(s) Patch 1 Patch Transdermal daily  NIFEdipine XL 30 milliGRAM(s) Oral daily  piperacillin/tazobactam IVPB. 3.375 Gram(s) IV Intermittent once  piperacillin/tazobactam IVPB.- 3.375 Gram(s) IV Intermittent once  piperacillin/tazobactam IVPB.. 3.375 Gram(s) IV Intermittent every 8 hours  polyethylene glycol 3350 17 Gram(s) Oral daily  senna 2 Tablet(s) Oral at bedtime  tiotropium 18 MICROgram(s) Capsule 1 Capsule(s) Inhalation daily    MEDICATIONS  (PRN):  acetaminophen     Tablet .. 650 milliGRAM(s) Oral every 6 hours PRN Temp greater or equal to 38C (100.4F), Mild Pain (1 - 3)  aluminum hydroxide/magnesium hydroxide/simethicone Suspension 30 milliLiter(s) Oral every 4 hours PRN Dyspepsia  bisacodyl 5 milliGRAM(s) Oral every 12 hours PRN Constipation  melatonin 3 milliGRAM(s) Oral at bedtime PRN Insomnia  ondansetron Injectable 4 milliGRAM(s) IV Push every 8 hours PRN Nausea and/or Vomiting    
                          Patient: HAILE GARVEY [id number] 71y Male                            Hospitalist Attending Note    Pt expresses desire for discharge.  Denies sob / chest pain / palpitations.     ____________________PHYSICAL EXAM:  GENERAL:  NAD alert, interactive.    HEENT: NCAT  CARDIOVASCULAR:  S1, S2  LUNGS: CTAB  ABDOMEN:  soft, (-) tenderness, (-) distension, (+) bowel sounds, (-) guarding, (-) rebound (-) rigidity  EXTREMITIES:  no cyanosis / clubbing / edema.   ____________________    VITALS:  Vital Signs Last 24 Hrs  T(C): 36.4 (13 Oct 2022 17:12), Max: 36.8 (13 Oct 2022 05:01)  T(F): 97.5 (13 Oct 2022 17:12), Max: 98.2 (13 Oct 2022 05:01)  HR: 99 (13 Oct 2022 17:12) (81 - 119)  BP: 126/87 (13 Oct 2022 17:12) (126/79 - 132/80)  BP(mean): 95 (13 Oct 2022 05:01) (95 - 97)  RR: 18 (13 Oct 2022 17:12) (18 - 19)  SpO2: 95% (13 Oct 2022 17:12) (94% - 97%)    Parameters below as of 13 Oct 2022 17:12  Patient On (Oxygen Delivery Method): room air     Daily     Daily Weight in k.9 (13 Oct 2022 05:01)  CAPILLARY BLOOD GLUCOSE        I&O's Summary    12 Oct 2022 07:  -  13 Oct 2022 07:00  --------------------------------------------------------  IN: 240 mL / OUT: 0 mL / NET: 240 mL    13 Oct 2022 07:01  -  13 Oct 2022 17:34  --------------------------------------------------------  IN: 400 mL / OUT: 0 mL / NET: 400 mL        LABS:                        11.8   16.13 )-----------( 463      ( 12 Oct 2022 08:05 )             38.9     1012    136  |  96  |  29<H>  ----------------------------<  126<H>  4.7   |  37<H>  |  0.76    Ca    9.3      12 Oct 2022 08:05  Phos  3.6     10-12  Mg     2.7     10-12    TPro  6.4  /  Alb  2.7<L>  /  TBili  0.2  /  DBili  x   /  AST  5<L>  /  ALT  15  /  AlkPhos  76  1012      LIVER FUNCTIONS - ( 12 Oct 2022 08:05 )  Alb: 2.7 g/dL / Pro: 6.4 gm/dL / ALK PHOS: 76 U/L / ALT: 15 U/L / AST: 5 U/L / GGT: x                     MEDICATIONS:  acetaminophen     Tablet .. 650 milliGRAM(s) Oral every 6 hours PRN  acetylcysteine 10%  Inhalation 4 milliLiter(s) Inhalation every 6 hours  albuterol/ipratropium for Nebulization 3 milliLiter(s) Nebulizer every 6 hours  aluminum hydroxide/magnesium hydroxide/simethicone Suspension 30 milliLiter(s) Oral every 4 hours PRN  bisacodyl 5 milliGRAM(s) Oral every 12 hours PRN  budesonide 160 MICROgram(s)/formoterol 4.5 MICROgram(s) Inhaler 2 Puff(s) Inhalation two times a day  enoxaparin Injectable 40 milliGRAM(s) SubCutaneous every 12 hours  melatonin 3 milliGRAM(s) Oral at bedtime PRN  nicotine - 21 mG/24Hr(s) Patch 1 Patch Transdermal daily  NIFEdipine XL 30 milliGRAM(s) Oral daily  ondansetron Injectable 4 milliGRAM(s) IV Push every 8 hours PRN  polyethylene glycol 3350 17 Gram(s) Oral daily  predniSONE   Tablet 40 milliGRAM(s) Oral daily  senna 2 Tablet(s) Oral at bedtime  tiotropium 18 MICROgram(s) Capsule 1 Capsule(s) Inhalation daily  trimethoprim  160 mG/sulfamethoxazole 800 mG 1 Tablet(s) Oral daily    
patient seen and examined  feels well  off 02   anxious today  tachypneic at times  spoke to patients sister in law and Pulmonlogist (Dr. Alatorre)  Ct scan essentially stable: patient recently underwent PET scan CT scan and biopsy . was diagnosed with organizing pNA responsive to taper    Review of Systems:  General:denies fever chills, headache, weakness  HEENT: denies blurry vision,diffculty swallowing, difficulty hearing, tinnitus  Cardiovascular: denies chest pain  ,palpitations  Pulmonary:denies shortness of breath, cough, wheezing, hemoptysis  Gastrointestinal: denies abdominal pain, constipation, diarrhea,nausea , vomiting, hematochezia  : denies hematuria, dysuria, or incontinence  Neurological: denies weakness, numbness , tingling, dizziness, tremors  MSK: denies muscle pain, difficulty ambulating, swelling, back pain  skin: denies skin rash, itching, burning, or  skin lesions  Psychiatrical: denies mood disturbances, anxierty, feeling depressed, depression , or difficulty sleeping    Objective:  Vitals  T(C): 36.6 (10-12-22 @ 11:04), Max: 36.9 (10-12-22 @ 00:26)  HR: 100 (10-12-22 @ 11:21) (68 - 114)  BP: 146/81 (10-12-22 @ 11:04) (106/71 - 146/81)  RR: 18 (10-12-22 @ 11:04) (18 - 19)  SpO2: 96% (10-12-22 @ 11:21) (92% - 97%)    Physical Exam:  General: comfortable, no acute distress, well nourished  HEENT: Atraumatic, no LAD, trachea midline, PERRLA  Cardiovascular: normal s1s2, no murmurs, gallops or fricition rubs  Pulmonary: clear to ausculation Bilaterally, no wheezing , rhonchi  Gastrointestinal: soft non tender non distended, no masses felt, no organomegally  Muscloskeletal: no lower extremity edema, intact bilateral lower extremity pulses  Neurological: CN II-12 intact. No focal weakness  Psychiatrical: normal mood, cooperative  SKIN: no rash, lesions or ulcers    Labs:                          11.8   16.13 )-----------( 463      ( 12 Oct 2022 08:05 )             38.9     10-12    136  |  96  |  29<H>  ----------------------------<  126<H>  4.7   |  37<H>  |  0.76    Ca    9.3      12 Oct 2022 08:05  Phos  3.6     10-12  Mg     2.7     10-12    TPro  6.4  /  Alb  2.7<L>  /  TBili  0.2  /  DBili  x   /  AST  5<L>  /  ALT  15  /  AlkPhos  76  10-12    LIVER FUNCTIONS - ( 12 Oct 2022 08:05 )  Alb: 2.7 g/dL / Pro: 6.4 gm/dL / ALK PHOS: 76 U/L / ALT: 15 U/L / AST: 5 U/L / GGT: x                 Active Medications  MEDICATIONS  (STANDING):  acetylcysteine 10%  Inhalation 4 milliLiter(s) Inhalation every 6 hours  albuterol/ipratropium for Nebulization 3 milliLiter(s) Nebulizer every 6 hours  budesonide 160 MICROgram(s)/formoterol 4.5 MICROgram(s) Inhaler 2 Puff(s) Inhalation two times a day  enoxaparin Injectable 40 milliGRAM(s) SubCutaneous every 12 hours  methylPREDNISolone sodium succinate Injectable 40 milliGRAM(s) IV Push every 8 hours  nicotine - 21 mG/24Hr(s) Patch 1 Patch Transdermal daily  NIFEdipine XL 30 milliGRAM(s) Oral daily  piperacillin/tazobactam IVPB.. 3.375 Gram(s) IV Intermittent every 8 hours  polyethylene glycol 3350 17 Gram(s) Oral daily  senna 2 Tablet(s) Oral at bedtime  tiotropium 18 MICROgram(s) Capsule 1 Capsule(s) Inhalation daily    MEDICATIONS  (PRN):  acetaminophen     Tablet .. 650 milliGRAM(s) Oral every 6 hours PRN Temp greater or equal to 38C (100.4F), Mild Pain (1 - 3)  aluminum hydroxide/magnesium hydroxide/simethicone Suspension 30 milliLiter(s) Oral every 4 hours PRN Dyspepsia  bisacodyl 5 milliGRAM(s) Oral every 12 hours PRN Constipation  melatonin 3 milliGRAM(s) Oral at bedtime PRN Insomnia  ondansetron Injectable 4 milliGRAM(s) IV Push every 8 hours PRN Nausea and/or Vomiting      
patient seen and examined  feels well  on 2 liters now at 94 percent  vitals stable  on iv steroids and abx    Review of Systems:  General:denies fever chills, headache, weakness  HEENT: denies blurry vision,diffculty swallowing, difficulty hearing, tinnitus  Cardiovascular: denies chest pain  ,palpitations  Pulmonary:denies shortness of breath, cough, wheezing, hemoptysis  Gastrointestinal: denies abdominal pain, constipation, diarrhea,nausea , vomiting, hematochezia  : denies hematuria, dysuria, or incontinence  Neurological: denies weakness, numbness , tingling, dizziness, tremors  MSK: denies muscle pain, difficulty ambulating, swelling, back pain  skin: denies skin rash, itching, burning, or  skin lesions  Psychiatrical: denies mood disturbances, anxierty, feeling depressed, depression , or difficulty sleeping    Objective:  Vitals  T(C): 36.4 (10-09-22 @ 10:54), Max: 37.1 (10-08-22 @ 19:04)  HR: 105 (10-09-22 @ 10:54) (81 - 146)  BP: 135/78 (10-09-22 @ 10:54) (124/76 - 162/99)  RR: 20 (10-09-22 @ 11:09) (20 - 45)  SpO2: 90% (10-09-22 @ 11:09) (90% - 100%)    Physical Exam:  General: comfortable, no acute distress, well nourished  HEENT: Atraumatic, no LAD, trachea midline, PERRLA  Cardiovascular: normal s1s2, no murmurs, gallops or fricition rubs  Pulmonary: expiratory wheezing, no wheezing , rhonchi  Gastrointestinal: soft non tender non distended, no masses felt, no organomegally  Muscloskeletal: no lower extremity edema, intact bilateral lower extremity pulses  Neurological: CN II-12 intact. No focal weakness  Psychiatrical: normal mood, cooperative  SKIN: no rash, lesions or ulcers    Labs:                          11.4   14.30 )-----------( 416      ( 09 Oct 2022 07:05 )             37.3     10-09    135  |  97  |  30<H>  ----------------------------<  191<H>  4.7   |  30  |  0.80    Ca    9.3      09 Oct 2022 07:05    TPro  8.0  /  Alb  3.3  /  TBili  0.5  /  DBili  x   /  AST  11<L>  /  ALT  18  /  AlkPhos  88  10-08    LIVER FUNCTIONS - ( 08 Oct 2022 19:33 )  Alb: 3.3 g/dL / Pro: 8.0 gm/dL / ALK PHOS: 88 U/L / ALT: 18 U/L / AST: 11 U/L / GGT: x           PT/INR - ( 08 Oct 2022 19:33 )   PT: 12.9 sec;   INR: 1.08 ratio         PTT - ( 08 Oct 2022 19:33 )  PTT:27.9 sec      Active Medications  MEDICATIONS  (STANDING):  albuterol/ipratropium (CFC free) Inhaler. 2 Puff(s) Inhalation four times a day  azithromycin  IVPB 500 milliGRAM(s) IV Intermittent every 24 hours  budesonide 160 MICROgram(s)/formoterol 4.5 MICROgram(s) Inhaler 2 Puff(s) Inhalation two times a day  cefTRIAXone   IVPB 1000 milliGRAM(s) IV Intermittent every 24 hours  influenza  Vaccine (HIGH DOSE) 0.7 milliLiter(s) IntraMuscular once  methylPREDNISolone sodium succinate Injectable 40 milliGRAM(s) IV Push every 8 hours  NIFEdipine XL 30 milliGRAM(s) Oral daily  tiotropium 18 MICROgram(s) Capsule 1 Capsule(s) Inhalation daily    MEDICATIONS  (PRN):  acetaminophen     Tablet .. 650 milliGRAM(s) Oral every 6 hours PRN Temp greater or equal to 38C (100.4F), Mild Pain (1 - 3)  aluminum hydroxide/magnesium hydroxide/simethicone Suspension 30 milliLiter(s) Oral every 4 hours PRN Dyspepsia  melatonin 3 milliGRAM(s) Oral at bedtime PRN Insomnia  ondansetron Injectable 4 milliGRAM(s) IV Push every 8 hours PRN Nausea and/or Vomiting

## 2022-10-13 NOTE — PROGRESS NOTE ADULT - NUTRITIONAL ASSESSMENT
This patient has been assessed with a concern for Malnutrition and has been determined to have a diagnosis/diagnoses of Moderate protein-calorie malnutrition.    This patient is being managed with:   Diet Soft and Bite Sized-  No Caffeine  No Citrus  No Tomatoes  Supplement Feeding Modality:  Oral  Ensure Enlive Cans or Servings Per Day:  1       Frequency:  Two Times a day  Entered: Oct 12 2022 11:28AM    Diet Soft and Bite Sized-  Entered: Oct 11 2022  7:16PM    The following pending diet order is being considered for treatment of Moderate protein-calorie malnutrition:null
This patient has been assessed with a concern for Malnutrition and has been determined to have a diagnosis/diagnoses of Moderate protein-calorie malnutrition.    This patient is being managed with:   Diet Soft and Bite Sized-  No Caffeine  No Citrus  No Tomatoes  Supplement Feeding Modality:  Oral  Ensure Enlive Cans or Servings Per Day:  1       Frequency:  Two Times a day  Entered: Oct 12 2022 11:28AM

## 2022-10-13 NOTE — PROGRESS NOTE ADULT - ASSESSMENT
No clinical concern that these cavities represent active disease, much less active TB.  Suspect presentation mostly COPD exacerbation  Role of Zosyn unclear.     10/12: no fever, WBC better 16.13, on steroids, BCs and UC with no growth. As per our conversation with Dr. Boyer, the pt is being followed by pulmonology as op, s/p recent biopsy, was diagnosed with cryptogenic organizing pna. Will stop zosyn IV, the pt is treated with steroids, will start Bactrim DS 1 tab po daily.   10/13: on po Bactrim for prophylaxis, no objections to discharge home if medically stable with a follow up with his pulmonologist as soon as possible.     Suggestions  continue Bactrim DS 1 tab po daily - give one month supply Rx  follow up with pulmonology as op  steroids per pulmonary  Supplemental O2 as needed   follow culture data  trend wbc and temperatures    discussed with Dr. Bustos  discussed with Dr. Villarreal  discussed with david SWANSON

## 2022-10-13 NOTE — PROGRESS NOTE ADULT - ASSESSMENT
This is a 70 y/o M active smoker with PMHx of asthma, COPD, emphysema, presented due to worsening sob x 4 days. At baseline patient does not require O2 and has never been intubated. He was recently prescribed penicillin for presumed PNA by pcp.  Admitted for COPD / CAP.  CT Chest showed Peribronchial thickening in the lower lobes more than upper lobe and numerous thick-walled cavitary lesions in the left upper and lower lobe up to 1.1 cm concerning for sequela from infectious disease.  Centrilobular emphysema.    # Acute Respiratory Failure with Hypoxia and Hypercapnea - respiratory status improving.  Off O2.   # COPD Exacerbation / Cryptogenic Organizing Pneumonia - s/p antibiotic course.  ID, pulmonary following.  Continue Steroids, prophylactic Bactrim.  Discussed with ID.  # L upper and lower lobe small cavitary lesion with adjacent 6mm nodule - Discussed need for outpatient f/u with pulmonary regarding these findings.  Pt acknowledges understanding.  # Essential HTN - Monitor BP.  Continue antihypertensives.  # Inpatient DVT Prophylaxis - Lovenox subcut

## 2022-10-13 NOTE — DISCHARGE NOTE NURSING/CASE MANAGEMENT/SOCIAL WORK - NSDCVIVACCINE_GEN_ALL_CORE_FT
influenza, high-dose, quadrivalent; 09-Oct-2022 14:30; Rianna Joe (AMADA); [employer]; [id number] (Exp. Date: 30-Jun-2023); IntraMuscular; Deltoid Right.; 0.7 milliLiter(s); VIS (VIS Published: 06-Aug-2021, VIS Presented: 09-Oct-2022);

## 2022-10-13 NOTE — PROGRESS NOTE ADULT - PROBLEM SELECTOR PROBLEM 1
Sepsis with acute hypoxic respiratory failure

## 2022-10-13 NOTE — DISCHARGE NOTE NURSING/CASE MANAGEMENT/SOCIAL WORK - NSDCPEWEB_GEN_ALL_CORE
[facility] for Tobacco Control website --- [email or web address] website --- [email or web address]

## 2022-10-13 NOTE — DISCHARGE NOTE NURSING/CASE MANAGEMENT/SOCIAL WORK - NSDCPEEMAIL_GEN_ALL_CORE
[facility] for Tobacco Control email [email or web address]

## 2022-10-13 NOTE — PROGRESS NOTE ADULT - PROBLEM SELECTOR PROBLEM 2
Sepsis with acute hypercapnic respiratory failure

## 2022-10-13 NOTE — DISCHARGE NOTE NURSING/CASE MANAGEMENT/SOCIAL WORK - PATIENT PORTAL LINK FT
You can access the FollowMyHealth Patient Portal offered by [facility] by registering at the following website: [email or web address]. By joining [employer] [facility]’s FollowMyHealth portal, you will also be able to view your health information using other applications (apps) compatible with our system.

## 2022-10-13 NOTE — PROGRESS NOTE ADULT - NSPROGADDITIONALINFOA_GEN_ALL_CORE
88 percent on room air  d/w sister in law and pulmonologist , ID and service pulm  offered patient rehab for 02.  patient wants to go back to shelter
88 percent on room air  d/w sister in law and pulmonologist , ID and service pulm  offered patient rehab for 02.  patient wants to go back to shelter
regular

## 2022-10-13 NOTE — PROGRESS NOTE ADULT - PROVIDER SPECIALTY LIST ADULT
Internal Medicine
Infectious Disease
Infectious Disease
Internal Medicine
Pulmonology
Pulmonology
Internal Medicine

## 2022-10-13 NOTE — PROGRESS NOTE ADULT - ASSESSMENT
*Pt seen earlier this afternoon  *pulmonologist's office (Dr. 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 he is maintained on trelegy ellipta, singulair, albuterol HFA q6 as needed        71M active smoker recently undomiciled with PMH COPD not on home O2, never intubated, asthma, , B/L hip replacement, blind L eye, Deaf L ear, hiatal hernia, GERD, schatzki's ring, esophageal dysmotility, dysphagia  presents for SOB x4 days. Found to be hypoxic. Admitted for COPD exacerbation, acute hypoxic respiratory failure, CT chest with L upper and lower lobe small cavitary lesion with adjacent 6mm nodule with centrilobular emphysema     - active smoker, nicotine patch  - d/w pt again need for smoking cessation  - no fever, no chills, no sweats, no weight loss  - charts reviewed and pt carries a diagnosis of : this is not an infectious pneumonia, it's an underlying inflammatory disease of lung  - he was recently started outpatient on steroid therapy with daily prednisone  - would recommend continuing prednisone 40mg daily (will need a slow taper over the course of weeks/months), no need for IV solumedrol  - due to extended course of steroid therapy would place on PCP prophylaxis (bactrim DS three times a week)  - CT chest reviewed: small cystic cavities without surrounding opacities. noted is also a small nodule adjacent to cavity. cavities do not appear to be fungal  - reviewed with chest radiologist   - suspect findings more consistent with sequale of prior infections taking into account underlying history  - cont duonebs ATC  - no suspicion for active bacterial infection, d/c antibx  - pt was placed on chest vest in the past (but he states he does not use it) and reportedly was also prescribed oxygen therapy in the past for which he does not use either  - can use aerobika assist cough device with nebulizers to help mobilize secretions  - upon d/c he should resume his trelegy, gary tomasir  - pt also reveals that his gastroenterologist stated to him he needs "baby food"- may benefit from putting pt on puree diet with esophageal motility issues  - d/c planning  - pt states he already has an appointment with his pulmonologist next week   warm

## 2022-10-13 NOTE — PROGRESS NOTE ADULT - PROBLEM SELECTOR PROBLEM 4
CAP (community acquired pneumonia)

## 2022-10-14 LAB
CULTURE RESULTS: SIGNIFICANT CHANGE UP
CULTURE RESULTS: SIGNIFICANT CHANGE UP
SPECIMEN SOURCE: SIGNIFICANT CHANGE UP
SPECIMEN SOURCE: SIGNIFICANT CHANGE UP

## 2022-10-18 DIAGNOSIS — Z72.0 TOBACCO USE: ICD-10-CM

## 2022-10-18 DIAGNOSIS — J45.909 UNSPECIFIED ASTHMA, UNCOMPLICATED: ICD-10-CM

## 2022-10-18 DIAGNOSIS — J44.1 CHRONIC OBSTRUCTIVE PULMONARY DISEASE WITH (ACUTE) EXACERBATION: ICD-10-CM

## 2022-10-18 DIAGNOSIS — A41.9 SEPSIS, UNSPECIFIED ORGANISM: ICD-10-CM

## 2022-10-18 DIAGNOSIS — E44.0 MODERATE PROTEIN-CALORIE MALNUTRITION: ICD-10-CM

## 2022-10-18 DIAGNOSIS — J18.9 PNEUMONIA, UNSPECIFIED ORGANISM: ICD-10-CM

## 2022-10-18 DIAGNOSIS — J96.02 ACUTE RESPIRATORY FAILURE WITH HYPERCAPNIA: ICD-10-CM

## 2022-10-18 DIAGNOSIS — J96.01 ACUTE RESPIRATORY FAILURE WITH HYPOXIA: ICD-10-CM

## 2022-10-18 DIAGNOSIS — R06.02 SHORTNESS OF BREATH: ICD-10-CM

## 2022-10-18 DIAGNOSIS — Z59.01 SHELTERED HOMELESSNESS: ICD-10-CM

## 2022-10-18 DIAGNOSIS — E87.29 OTHER ACIDOSIS: ICD-10-CM

## 2022-10-18 SDOH — ECONOMIC STABILITY - HOUSING INSECURITY: SHELTERED HOMELESSNESS: Z59.01

## 2022-10-19 ENCOUNTER — INPATIENT (INPATIENT)
Facility: HOSPITAL | Age: 72
LOS: 0 days | Discharge: LEFT BEFORE DISCHARGE | End: 2022-10-19
Attending: INTERNAL MEDICINE | Admitting: INTERNAL MEDICINE

## 2022-10-19 VITALS
HEIGHT: 63 IN | OXYGEN SATURATION: 99 % | SYSTOLIC BLOOD PRESSURE: 115 MMHG | RESPIRATION RATE: 20 BRPM | TEMPERATURE: 98 F | DIASTOLIC BLOOD PRESSURE: 47 MMHG | WEIGHT: 123.9 LBS | HEART RATE: 67 BPM

## 2022-10-19 VITALS
RESPIRATION RATE: 30 BRPM | SYSTOLIC BLOOD PRESSURE: 114 MMHG | HEART RATE: 107 BPM | OXYGEN SATURATION: 94 % | DIASTOLIC BLOOD PRESSURE: 66 MMHG | TEMPERATURE: 98 F

## 2022-10-19 DIAGNOSIS — Z96.643 PRESENCE OF ARTIFICIAL HIP JOINT, BILATERAL: Chronic | ICD-10-CM

## 2022-10-19 LAB
ACANTHOCYTES BLD QL SMEAR: SLIGHT — SIGNIFICANT CHANGE UP
ALBUMIN SERPL ELPH-MCNC: 2.8 G/DL — LOW (ref 3.3–5)
ALP SERPL-CCNC: 90 U/L — SIGNIFICANT CHANGE UP (ref 40–120)
ALT FLD-CCNC: 29 U/L — SIGNIFICANT CHANGE UP (ref 12–78)
ANION GAP SERPL CALC-SCNC: 5 MMOL/L — SIGNIFICANT CHANGE UP (ref 5–17)
APTT BLD: 31.9 SEC — SIGNIFICANT CHANGE UP (ref 27.5–35.5)
AST SERPL-CCNC: 28 U/L — SIGNIFICANT CHANGE UP (ref 15–37)
BASE EXCESS BLDA CALC-SCNC: 9.4 MMOL/L — HIGH (ref -2–3)
BASOPHILS # BLD AUTO: 0.07 K/UL — SIGNIFICANT CHANGE UP (ref 0–0.2)
BASOPHILS NFR BLD AUTO: 0.4 % — SIGNIFICANT CHANGE UP (ref 0–2)
BILIRUB SERPL-MCNC: 0.6 MG/DL — SIGNIFICANT CHANGE UP (ref 0.2–1.2)
BLOOD GAS COMMENTS ARTERIAL: SIGNIFICANT CHANGE UP
BUN SERPL-MCNC: 19 MG/DL — SIGNIFICANT CHANGE UP (ref 7–23)
CALCIUM SERPL-MCNC: 8.9 MG/DL — SIGNIFICANT CHANGE UP (ref 8.5–10.1)
CHLORIDE SERPL-SCNC: 93 MMOL/L — LOW (ref 96–108)
CO2 BLDA-SCNC: 36 MMOL/L — HIGH (ref 19–24)
CO2 SERPL-SCNC: 33 MMOL/L — HIGH (ref 22–31)
CREAT SERPL-MCNC: 0.93 MG/DL — SIGNIFICANT CHANGE UP (ref 0.5–1.3)
D DIMER BLD IA.RAPID-MCNC: 208 NG/ML DDU — SIGNIFICANT CHANGE UP
EGFR: 89 ML/MIN/1.73M2 — SIGNIFICANT CHANGE UP
EOSINOPHIL # BLD AUTO: 0.12 K/UL — SIGNIFICANT CHANGE UP (ref 0–0.5)
EOSINOPHIL NFR BLD AUTO: 0.7 % — SIGNIFICANT CHANGE UP (ref 0–6)
FLUAV AG NPH QL: SIGNIFICANT CHANGE UP
FLUBV AG NPH QL: SIGNIFICANT CHANGE UP
GAS PNL BLDA: SIGNIFICANT CHANGE UP
GLUCOSE SERPL-MCNC: 85 MG/DL — SIGNIFICANT CHANGE UP (ref 70–99)
HCO3 BLDA-SCNC: 35 MMOL/L — HIGH (ref 21–28)
HCT VFR BLD CALC: 45.2 % — SIGNIFICANT CHANGE UP (ref 39–50)
HGB BLD-MCNC: 14.2 G/DL — SIGNIFICANT CHANGE UP (ref 13–17)
HOROWITZ INDEX BLDA+IHG-RTO: 21 — SIGNIFICANT CHANGE UP
IMM GRANULOCYTES NFR BLD AUTO: 2.2 % — HIGH (ref 0–0.9)
INR BLD: 0.93 RATIO — SIGNIFICANT CHANGE UP (ref 0.88–1.16)
LYMPHOCYTES # BLD AUTO: 1.06 K/UL — SIGNIFICANT CHANGE UP (ref 1–3.3)
LYMPHOCYTES # BLD AUTO: 6.5 % — LOW (ref 13–44)
MANUAL SMEAR VERIFICATION: YES — SIGNIFICANT CHANGE UP
MCHC RBC-ENTMCNC: 26.1 PG — LOW (ref 27–34)
MCHC RBC-ENTMCNC: 31.4 G/DL — LOW (ref 32–36)
MCV RBC AUTO: 82.9 FL — SIGNIFICANT CHANGE UP (ref 80–100)
MONOCYTES # BLD AUTO: 1.13 K/UL — HIGH (ref 0–0.9)
MONOCYTES NFR BLD AUTO: 6.9 % — SIGNIFICANT CHANGE UP (ref 2–14)
NEUTROPHILS # BLD AUTO: 13.63 K/UL — HIGH (ref 1.8–7.4)
NEUTROPHILS NFR BLD AUTO: 83.3 % — HIGH (ref 43–77)
NRBC # BLD: 0 /100 WBCS — SIGNIFICANT CHANGE UP (ref 0–0)
NT-PROBNP SERPL-SCNC: 70 PG/ML — SIGNIFICANT CHANGE UP (ref 0–125)
PCO2 BLDA: 49 MMHG — HIGH (ref 32–46)
PH BLDA: 7.46 — HIGH (ref 7.35–7.45)
PLAT MORPH BLD: NORMAL — SIGNIFICANT CHANGE UP
PLATELET # BLD AUTO: 415 K/UL — HIGH (ref 150–400)
PO2 BLDA: 63 MMHG — LOW (ref 83–108)
POTASSIUM SERPL-MCNC: 5.1 MMOL/L — SIGNIFICANT CHANGE UP (ref 3.5–5.3)
POTASSIUM SERPL-SCNC: 5.1 MMOL/L — SIGNIFICANT CHANGE UP (ref 3.5–5.3)
PROT SERPL-MCNC: 6.9 GM/DL — SIGNIFICANT CHANGE UP (ref 6–8.3)
PROTHROM AB SERPL-ACNC: 11.1 SEC — SIGNIFICANT CHANGE UP (ref 10.5–13.4)
RBC # BLD: 5.45 M/UL — SIGNIFICANT CHANGE UP (ref 4.2–5.8)
RBC # FLD: 15.1 % — HIGH (ref 10.3–14.5)
RBC BLD AUTO: SIGNIFICANT CHANGE UP
SAO2 % BLDA: 94.8 % — SIGNIFICANT CHANGE UP (ref 94–98)
SARS-COV-2 RNA SPEC QL NAA+PROBE: DETECTED
SODIUM SERPL-SCNC: 131 MMOL/L — LOW (ref 135–145)
TROPONIN I, HIGH SENSITIVITY RESULT: 12 NG/L — SIGNIFICANT CHANGE UP
WBC # BLD: 16.37 K/UL — HIGH (ref 3.8–10.5)
WBC # FLD AUTO: 16.37 K/UL — HIGH (ref 3.8–10.5)

## 2022-10-19 PROCEDURE — 99285 EMERGENCY DEPT VISIT HI MDM: CPT

## 2022-10-19 PROCEDURE — 71275 CT ANGIOGRAPHY CHEST: CPT | Mod: 26,MA

## 2022-10-19 PROCEDURE — 99222 1ST HOSP IP/OBS MODERATE 55: CPT

## 2022-10-19 PROCEDURE — 74177 CT ABD & PELVIS W/CONTRAST: CPT | Mod: 26,MA

## 2022-10-19 PROCEDURE — 71045 X-RAY EXAM CHEST 1 VIEW: CPT | Mod: 26

## 2022-10-19 RX ORDER — ACETAMINOPHEN 500 MG
650 TABLET ORAL EVERY 6 HOURS
Refills: 0 | Status: DISCONTINUED | OUTPATIENT
Start: 2022-10-19 | End: 2022-10-19

## 2022-10-19 RX ORDER — LANOLIN ALCOHOL/MO/W.PET/CERES
3 CREAM (GRAM) TOPICAL AT BEDTIME
Refills: 0 | Status: DISCONTINUED | OUTPATIENT
Start: 2022-10-19 | End: 2022-10-19

## 2022-10-19 RX ORDER — DEXAMETHASONE 0.5 MG/5ML
6 ELIXIR ORAL ONCE
Refills: 0 | Status: COMPLETED | OUTPATIENT
Start: 2022-10-19 | End: 2022-10-19

## 2022-10-19 RX ORDER — SODIUM CHLORIDE 9 MG/ML
1000 INJECTION, SOLUTION INTRAVENOUS
Refills: 0 | Status: DISCONTINUED | OUTPATIENT
Start: 2022-10-19 | End: 2022-10-19

## 2022-10-19 RX ORDER — ENOXAPARIN SODIUM 100 MG/ML
40 INJECTION SUBCUTANEOUS EVERY 24 HOURS
Refills: 0 | Status: DISCONTINUED | OUTPATIENT
Start: 2022-10-19 | End: 2022-10-19

## 2022-10-19 RX ORDER — ONDANSETRON 8 MG/1
4 TABLET, FILM COATED ORAL EVERY 8 HOURS
Refills: 0 | Status: DISCONTINUED | OUTPATIENT
Start: 2022-10-19 | End: 2022-10-19

## 2022-10-19 RX ADMIN — Medication 1 ENEMA: at 16:38

## 2022-10-19 RX ADMIN — Medication 6 MILLIGRAM(S): at 18:18

## 2022-10-19 NOTE — H&P ADULT - NSICDXPASTMEDICALHX_GEN_ALL_CORE_FT
PAST MEDICAL HISTORY:  COPD, moderate     GERD (gastroesophageal reflux disease)     History of esophageal stricture     HTN (hypertension)     Tobacco abuse

## 2022-10-19 NOTE — ED ADULT NURSE NOTE - OBJECTIVE STATEMENT
Patient states he was in the lobby at [facility] talking when he started having SOB, he said 911 was called and he was brought to the wrong hospital. He prefers [facility]. Patient states he was in the lobby at [facility] talking when he started having SOB, he said 911 was called and he was brought to the wrong hospital. He prefers [facility]. Patient reports history of COPD.

## 2022-10-19 NOTE — ED PROVIDER NOTE - OBJECTIVE STATEMENT
71 year old male with PMH of COPD, GERD, esophageal strictures otherwise presenting to ED due to abdominal pain, constipation symptoms x 4-5 days and noted to have SOB, and chest pain worse with exertion. States no improvement otherwise. Has not had any medications to help improve condition otherwise.

## 2022-10-19 NOTE — ED ADULT NURSE REASSESSMENT NOTE - NS ED NURSE REASSESS COMMENT FT1
Patient states he feels better. Fleet enema given, patient had large BM. o2 sat 96% on 2l nc. Meal provided, patient states he is ready to go back to his hotel.

## 2022-10-19 NOTE — H&P ADULT - NSHPPHYSICALEXAM_GEN_ALL_CORE
PHYSICAL EXAM:  GENERAL: NAD, lying in bed comfortably  HEAD:  Atraumatic, Normocephalic  EYES: EOMI, PERRLA, conjunctiva and sclera clear  ENT: Moist mucous membranes  NECK: Supple, No JVD  CHEST/LUNG: B/L few fine crepts. Unlabored respirations  HEART: Regular rate and rhythm; No murmurs, rubs, or gallops  ABDOMEN: Bowel sounds present; Soft, Nontender, Nondistended.   EXTREMITIES:  2+ Peripheral Pulses, brisk capillary refill. No clubbing, cyanosis, or edema  NERVOUS SYSTEM:  Alert & Oriented X3, speech clear. No deficits   MSK: FROM all 4 extremities, full and equal strength  SKIN: No rashes or lesions

## 2022-10-19 NOTE — H&P ADULT - ASSESSMENT
The patient is a 71 year old male with PMH of HTN, COPD not home oxygen dependent, GERD, Chronic leukocytosis due to steroid, esophageal strictures and others presented to ED with c/o difficulty breathing, some abdominal discomfort and constipation symptoms x 4-5 days.  He also has some dry cough. Denies chest pain, fever, chills, palpitation, diarrhea or dysuria. He was hypoxic in ED upon arrival with sats<90s, and was requiring 2 L supplemental oxygen to keep sats of 94%.  During my evaluation, he was off oxygen, and he was saturating well on room air even during conversation. He denies any vomiting or headache. Further, he added that he may sign out AMA because he was kept with other sick patient in his room in 36D.  I have explained him the risks associated with signing out AMA including death, and he verbalized understanding.    CBC and CMP unremarkable except WBC 16.37 (at baseline.), D-Dimer 208, BNP 70.  COVOD-19 POSITIVE,  CXR negative for infiltrates.  CTA chest negative for PE or infiltrates.    Dexamethasone 6 mg IV given in ED.    1. Acute respiratory failure with hypoxia due to COVID-19 infection.  Now he is off supplemental oxygen. Saturating well on room air.  Isolation per COVID-19 protocol.  Supportive care.    2. COPD.  Not in exacerbation.  Continue his home inhalers.    3. HTN  Stable and controlled.  Continue his Nifedipine.    4. Tobacco abuse.  Smoking cessation councelling.  Nicotine patch offered but he declined.    5. DVT prophylaxis: Lovenox sq daily.    I was just informed by ED RN that patient has signed out AMA.  He was AAOx3, and vitals stable, and was not on oxygen.

## 2022-10-19 NOTE — H&P ADULT - HISTORY OF PRESENT ILLNESS
Chief Complaint: difficulty breathing.     The patient is a 71 year old male with PMH of HTN, COPD not home oxygen dependent, GERD, Chronic leukocytosis due to steroid, esophageal strictures and others presented to ED with c/o difficulty breathing, some abdominal discomfort and constipation symptoms x 4-5 days.  He also has some dry cough. Denies chest pain, fever, chills, palpitation, diarrhea or dysuria. He was hypoxic in ED upon arrival with sats<90s, and was requiring 2 L supplemental oxygen to keep sats of 94%.  During my evaluation, he was off oxygen, and he was saturating well on room air even during conversation. Further, he added that he may sign out AMA because he was kept with other sick patient in his room in 36D.  I have explained him the risks associated with signing out AMA including death, and he verbalized understanding.      He denies any vomiting or headache.    CBC and CMP unremarkable except WBC 16.37 (at baseline.), D-Dimer 208, BNP 70.  COVOD-19 POSITIVE,  CXR negative for infiltrates.  CTA chest negative for PE or infiltrates.    Dexamethasone 6 mg IV given in ED.

## 2022-10-19 NOTE — ED PROVIDER NOTE - CLINICAL SUMMARY MEDICAL DECISION MAKING FREE TEXT BOX
hypoxia secondary to new covid infection - will admit for further care with Dr Shaw, otherwise no wheezing, given decadron for Covid hypoxia.

## 2022-10-19 NOTE — ED ADULT TRIAGE NOTE - CHIEF COMPLAINT QUOTE
Pt biba picked up from shelter ([facility] ) with c/o difficulty breathing, and epigastric pain ( described as burning) x today. h/o COPD, Asthma

## 2022-10-19 NOTE — H&P ADULT - NSHPLABSRESULTS_GEN_ALL_CORE
LABS:                        14.2   16.37 )-----------( 415      ( 19 Oct 2022 14:13 )             45.2     10-19    131<L>  |  93<L>  |  19  ----------------------------<  85  5.1   |  33<H>  |  0.93    Ca    8.9      19 Oct 2022 14:13    TPro  6.9  /  Alb  2.8<L>  /  TBili  0.6  /  DBili  x   /  AST  28  /  ALT  29  /  AlkPhos  90  10-19    PT/INR - ( 19 Oct 2022 14:13 )   PT: 11.1 sec;   INR: 0.93 ratio         PTT - ( 19 Oct 2022 14:13 )  PTT:31.9 sec        < from: CT Angio Chest PE Protocol w/ IV Cont (10.19.22 @ 15:54) >      IMPRESSION:  1. Bronchitis, similar to prior CT pulmonary angiogram with scattered   thick walled cystic regions in the upper lobes, also likely related to   airway infection. COPD. Trace emphysema.  2. Large sliding hiatal hernia.  3. No evidence of acute inflammatory or obstructive process in the   abdomen and pelvis.  4. No evidence of pulmonary embolism.    --- End of Report ---      < end of copied text >      < from: CT Abdomen and Pelvis w/ IV Cont (10.19.22 @ 15:54) >    IMPRESSION:  1. Bronchitis, similar to prior CT pulmonary angiogram with scattered   thick walled cystic regions in the upper lobes, also likely related to   airway infection. COPD. Trace emphysema.  2. Large sliding hiatal hernia.  3. No evidence of acute inflammatory or obstructive process in the   abdomen and pelvis.  4. No evidence of pulmonary embolism.    --- End of Report ---    < end of copied text >        < from: Xray Chest 1 View- PORTABLE-Urgent (Xray Chest 1 View- PORTABLE-Urgent .) (10.08.22 @ 21:29) >    Impression: No active pulmonary disease.    --- End of Report ---    < end of copied text >

## 2022-10-19 NOTE — CHART NOTE - NSCHARTNOTEFT_GEN_A_CORE
Hospitalist Medicine NP    Patient is a 71y old Male admitted for Hypoxia and COVID 19 . Patient is requesting to leave hospital AMA. Pt explained risks of leaving AMA including worsening of symptoms and risk of death. Pt verbalized understanding and refused to sign the AMA form.   T(C): 36.7 (10-19-22 @ 15:14), Max: 36.9 (10-19-22 @ 12:49)  HR: 107 (10-19-22 @ 15:14) (67 - 107)  BP: 114/66 (10-19-22 @ 15:14) (114/66 - 115/47)  RR: 19 (10-19-22 @ 15:14) (19 - 20)  SpO2: 94% (10-19-22 @ 15:14) (94% - 99%)    - Dr. Colin Mccarty notified.    Tamia Abreu NP- C.

## 2022-10-19 NOTE — ED ADULT NURSE NOTE - NSIMPLEMENTINTERV_GEN_ALL_ED
Implemented All Fall Risk Interventions:  [place] to call system. Call bell, personal items and telephone within reach. Instruct patient to call for assistance. Room bathroom lighting operational. Non-slip footwear when patient is off stretcher. Physically safe environment: no spills, clutter or unnecessary equipment. Stretcher in lowest position, wheels locked, appropriate side rails in place. Provide visual cue, wrist band, yellow gown, etc. Monitor gait and stability. Monitor for mental status changes and reorient to person, place, and time. Review medications for side effects contributing to fall risk. Reinforce activity limits and safety measures with patient and family.

## 2022-10-26 DIAGNOSIS — F17.200 NICOTINE DEPENDENCE, UNSPECIFIED, UNCOMPLICATED: ICD-10-CM

## 2022-10-26 DIAGNOSIS — J44.9 CHRONIC OBSTRUCTIVE PULMONARY DISEASE, UNSPECIFIED: ICD-10-CM

## 2022-10-26 DIAGNOSIS — U07.1 COVID-19: ICD-10-CM

## 2022-10-26 DIAGNOSIS — I10 ESSENTIAL (PRIMARY) HYPERTENSION: ICD-10-CM

## 2022-10-26 DIAGNOSIS — Z53.20 PROCEDURE AND TREATMENT NOT CARRIED OUT BECAUSE OF PATIENT'S DECISION FOR UNSPECIFIED REASONS: ICD-10-CM

## 2022-10-26 DIAGNOSIS — J96.01 ACUTE RESPIRATORY FAILURE WITH HYPOXIA: ICD-10-CM

## 2022-10-26 DIAGNOSIS — Z96.643 PRESENCE OF ARTIFICIAL HIP JOINT, BILATERAL: ICD-10-CM

## 2022-10-26 DIAGNOSIS — D72.829 ELEVATED WHITE BLOOD CELL COUNT, UNSPECIFIED: ICD-10-CM

## 2022-10-26 DIAGNOSIS — K21.9 GASTRO-ESOPHAGEAL REFLUX DISEASE WITHOUT ESOPHAGITIS: ICD-10-CM

## 2022-11-02 NOTE — CONSULT NOTE ADULT - TIME-BASED BILLING (NON-CRITICAL CARE)
Time-based billing (NON-critical care) Mercedes Flap Text: The defect edges were debeveled with a #15 scalpel blade.  Given the location of the defect, shape of the defect and the proximity to free margins a Mercedes flap was deemed most appropriate.  Using a sterile surgical marker, an appropriate advancement flap was drawn incorporating the defect and placing the expected incisions within the relaxed skin tension lines where possible. The area thus outlined was incised deep to adipose tissue with a #15 scalpel blade.  The skin margins were undermined to an appropriate distance in all directions utilizing iris scissors.

## 2024-08-23 NOTE — ED PROVIDER NOTE - DOMESTIC TRAVEL HIGH RISK QUESTION
Case Management Discharge Planning Note    Patient name Maribel Howe  [facility] /E5 -* MRN [id number]  : 10/24/[facility] [address], [address]  [address], [address]  [facility] [address]  Phone: [phone] Fax: [phone]    [facility] [address]  [address]  [facility] [address]  Phone: [phone] Fax: [phone]    Primary Care Provider: Arcadio Mcarthur MD    Primary Insurance: MEDICARE  Secondary Insurance: BLUE CROSS    DISCHARGE DETAILS:                                                    Treatment Team Recommendation: SNF  Discharge Destination Plan:: SNF  Transport at Discharge : [facility]  Dispatcher Contacted: Yes  Number/Name of Dispatcher: [facility]  Transported by (Company and Unit #): [employer] [facility] [phone]  Holmes County Joel Pomerene Memorial HospitalS at 1600.  Patient's daughter Zee, provider, nursing and facility aware of d/c arrangements.    Accepting Facility Name, City & State : [place]  Receiving Facility/Agency Phone Number: [phone]               No